# Patient Record
Sex: MALE | Race: WHITE | NOT HISPANIC OR LATINO | URBAN - METROPOLITAN AREA
[De-identification: names, ages, dates, MRNs, and addresses within clinical notes are randomized per-mention and may not be internally consistent; named-entity substitution may affect disease eponyms.]

---

## 2017-01-25 ENCOUNTER — APPOINTMENT (RX ONLY)
Dept: URBAN - METROPOLITAN AREA CLINIC 43 | Facility: CLINIC | Age: 72
Setting detail: DERMATOLOGY
End: 2017-01-25

## 2017-01-25 DIAGNOSIS — L30.9 DERMATITIS, UNSPECIFIED: ICD-10-CM

## 2017-01-25 PROCEDURE — 99212 OFFICE O/P EST SF 10 MIN: CPT

## 2017-01-25 PROCEDURE — ? TREATMENT REGIMEN

## 2017-01-25 ASSESSMENT — LOCATION DETAILED DESCRIPTION DERM
LOCATION DETAILED: RIGHT LATERAL DISTAL PRETIBIAL REGION
LOCATION DETAILED: RIGHT MEDIAL DISTAL PRETIBIAL REGION

## 2017-01-25 ASSESSMENT — LOCATION SIMPLE DESCRIPTION DERM: LOCATION SIMPLE: RIGHT PRETIBIAL REGION

## 2017-01-25 ASSESSMENT — LOCATION ZONE DERM: LOCATION ZONE: LEG

## 2017-01-25 NOTE — PROCEDURE: TREATMENT REGIMEN
Initiate Treatment: Clobetasol bid for 1 week, then qd for 1 week.
Otc Regimen: Leg elevation and compression stockings.  Keep moist with Vaseline.
Plan: Call the office if F/C/S expanding erythema or new onset pain.
Detail Level: Simple

## 2017-02-08 ENCOUNTER — APPOINTMENT (RX ONLY)
Dept: URBAN - METROPOLITAN AREA CLINIC 43 | Facility: CLINIC | Age: 72
Setting detail: DERMATOLOGY
End: 2017-02-08

## 2017-02-08 DIAGNOSIS — L85.3 XEROSIS CUTIS: ICD-10-CM | Status: IMPROVED

## 2017-02-08 PROCEDURE — ? TREATMENT REGIMEN

## 2017-02-08 PROCEDURE — 99212 OFFICE O/P EST SF 10 MIN: CPT

## 2017-02-08 ASSESSMENT — LOCATION DETAILED DESCRIPTION DERM
LOCATION DETAILED: RIGHT ANKLE
LOCATION DETAILED: RIGHT DISTAL PRETIBIAL REGION

## 2017-02-08 ASSESSMENT — LOCATION SIMPLE DESCRIPTION DERM
LOCATION SIMPLE: RIGHT PRETIBIAL REGION
LOCATION SIMPLE: RIGHT ANKLE

## 2017-02-08 ASSESSMENT — LOCATION ZONE DERM: LOCATION ZONE: LEG

## 2017-02-08 NOTE — PROCEDURE: TREATMENT REGIMEN
Detail Level: Simple
Continue Regimen: Leg elevations and compression stockings.
Otc Regimen: OK to transition to OTC moisturizing creams.
Modify Regimen: Clobetasol PRN flares.

## 2017-12-08 ENCOUNTER — APPOINTMENT (RX ONLY)
Dept: URBAN - METROPOLITAN AREA CLINIC 43 | Facility: CLINIC | Age: 72
Setting detail: DERMATOLOGY
End: 2017-12-08

## 2017-12-08 DIAGNOSIS — L57.0 ACTINIC KERATOSIS: ICD-10-CM

## 2017-12-08 DIAGNOSIS — I87.2 VENOUS INSUFFICIENCY (CHRONIC) (PERIPHERAL): ICD-10-CM

## 2017-12-08 PROBLEM — I83.12 VARICOSE VEINS OF LEFT LOWER EXTREMITY WITH INFLAMMATION: Status: ACTIVE | Noted: 2017-12-08

## 2017-12-08 PROBLEM — I83.11 VARICOSE VEINS OF RIGHT LOWER EXTREMITY WITH INFLAMMATION: Status: ACTIVE | Noted: 2017-12-08

## 2017-12-08 PROCEDURE — ? TREATMENT REGIMEN

## 2017-12-08 PROCEDURE — ? COUNSELING

## 2017-12-08 PROCEDURE — 99213 OFFICE O/P EST LOW 20 MIN: CPT

## 2017-12-08 ASSESSMENT — LOCATION DETAILED DESCRIPTION DERM
LOCATION DETAILED: LEFT CENTRAL MALAR CHEEK
LOCATION DETAILED: RIGHT CENTRAL MALAR CHEEK
LOCATION DETAILED: LEFT DISTAL PRETIBIAL REGION
LOCATION DETAILED: RIGHT DISTAL PRETIBIAL REGION
LOCATION DETAILED: LEFT INFERIOR FOREHEAD
LOCATION DETAILED: RIGHT SUPERIOR CRUS OF ANTIHELIX
LOCATION DETAILED: RIGHT INFERIOR FOREHEAD
LOCATION DETAILED: LEFT CYMBA CONCHA

## 2017-12-08 ASSESSMENT — LOCATION ZONE DERM
LOCATION ZONE: EAR
LOCATION ZONE: FACE
LOCATION ZONE: LEG

## 2017-12-08 ASSESSMENT — LOCATION SIMPLE DESCRIPTION DERM
LOCATION SIMPLE: LEFT EAR
LOCATION SIMPLE: LEFT CHEEK
LOCATION SIMPLE: LEFT PRETIBIAL REGION
LOCATION SIMPLE: RIGHT PRETIBIAL REGION
LOCATION SIMPLE: RIGHT CHEEK
LOCATION SIMPLE: RIGHT FOREHEAD
LOCATION SIMPLE: LEFT FOREHEAD
LOCATION SIMPLE: RIGHT EAR

## 2017-12-08 NOTE — PROCEDURE: TREATMENT REGIMEN
Detail Level: Zone
Plan: Worsening AKs on face\\nDiscussed field therapy with 5FU or PDT and he opted for PDT\\nWill be first field therapy for him\\n\\n1 HR INCUBATION TO FACE AND EARS
Plan: Looking very good today \\nHad episode of lower leg cellulitis complicated by sepsis in 1/2017; now recovered\\nRecommended restarting compression stockings\\nContinue bland moisturizer use twice daily to lower legs

## 2018-02-16 ENCOUNTER — APPOINTMENT (RX ONLY)
Dept: URBAN - METROPOLITAN AREA CLINIC 43 | Facility: CLINIC | Age: 73
Setting detail: DERMATOLOGY
End: 2018-02-16

## 2018-02-16 DIAGNOSIS — L57.0 ACTINIC KERATOSIS: ICD-10-CM

## 2018-02-16 PROCEDURE — ? PDT: BLUE

## 2018-02-16 PROCEDURE — 96574 DBRDMT PRMLG LES W/PDT: CPT

## 2018-02-16 ASSESSMENT — LOCATION DETAILED DESCRIPTION DERM: LOCATION DETAILED: RIGHT CENTRAL MALAR CHEEK

## 2018-02-16 ASSESSMENT — LOCATION ZONE DERM: LOCATION ZONE: FACE

## 2018-02-16 ASSESSMENT — LOCATION SIMPLE DESCRIPTION DERM: LOCATION SIMPLE: RIGHT CHEEK

## 2018-02-16 NOTE — PROCEDURE: PDT: BLUE
Debridement Text (Will Only Render In Visit Note If You Select Debridement Option Under Who Performed The Pdt Field): Prior to application of the photodynamic medication the hyperkeratotic lesions were curetted to make them more amenable to therapy.
Treatment Number: 0
Which Photosensitizer Was Used: Levulan
Detail Level: Zone
Occlusion: No
Illumination Time: 01.0 min
Pre-Procedure Text: The treatment areas were cleaned and prepped in the usual fashion.
Who Performed The Pdt?: Performed by MD DIAN, ZARINA or SEEMA with Pre-Procedure Debridement of Hyperkeratotic Lesions ([id number])
Light Source: Chato-U
Anesthesia Type: 1% lidocaine with epinephrine
Consent: Written consent obtained.  The risks were reviewed with the patient including but not limited to: pigmentary changes, pain, blistering, scabbing, redness, and the remote possibility of scarring.
Number Of Kerasticks/Tubes Billed For: 1
Incubation Time: 01:00
Post-Care Instructions: I reviewed with the patient in detail post-care instructions. Patient is to avoid sunlight for the next 2 days, and wear sun protection. Patients may expect sunburn like redness, discomfort and scabbing.

## 2018-02-16 NOTE — HPI: OTHER
Condition:: PDT
Please Describe Your Condition:: Here today for planned TEMO-U PDT to the face and ears for actinic keratoses.  Has not done field therapy in past.  Retired dentist.

## 2018-04-04 ENCOUNTER — APPOINTMENT (RX ONLY)
Dept: URBAN - METROPOLITAN AREA CLINIC 43 | Facility: CLINIC | Age: 73
Setting detail: DERMATOLOGY
End: 2018-04-04

## 2018-04-04 DIAGNOSIS — L57.0 ACTINIC KERATOSIS: ICD-10-CM

## 2018-04-04 PROCEDURE — 99212 OFFICE O/P EST SF 10 MIN: CPT

## 2018-04-04 PROCEDURE — ? TREATMENT REGIMEN

## 2018-04-04 ASSESSMENT — LOCATION SIMPLE DESCRIPTION DERM
LOCATION SIMPLE: RIGHT FOREHEAD
LOCATION SIMPLE: RIGHT CHEEK
LOCATION SIMPLE: LEFT FOREHEAD
LOCATION SIMPLE: LEFT CHEEK

## 2018-04-04 ASSESSMENT — LOCATION DETAILED DESCRIPTION DERM
LOCATION DETAILED: LEFT INFERIOR CENTRAL MALAR CHEEK
LOCATION DETAILED: LEFT LATERAL FOREHEAD
LOCATION DETAILED: RIGHT SUPERIOR LATERAL MALAR CHEEK
LOCATION DETAILED: RIGHT LATERAL FOREHEAD

## 2018-04-04 ASSESSMENT — LOCATION ZONE DERM: LOCATION ZONE: FACE

## 2018-04-04 NOTE — PROCEDURE: TREATMENT REGIMEN
Plan: Actinic keratoses - Improved although not to desired degree after just 1 min PDT after 2 hr incubation \\n- recommend “painless PDT protocol” with 15 min incubation and 1 hr light treatment\\n- will schedule \\n- recheck again 4-6 wks post PDT
Detail Level: Zone

## 2018-05-25 ENCOUNTER — APPOINTMENT (RX ONLY)
Dept: URBAN - METROPOLITAN AREA CLINIC 43 | Facility: CLINIC | Age: 73
Setting detail: DERMATOLOGY
End: 2018-05-25

## 2018-05-25 DIAGNOSIS — L57.0 ACTINIC KERATOSIS: ICD-10-CM

## 2018-05-25 PROCEDURE — 96573 PDT DSTR PRMLG LES PHYS/QHP: CPT

## 2018-05-25 PROCEDURE — ? PDT: BLUE

## 2018-05-25 ASSESSMENT — LOCATION SIMPLE DESCRIPTION DERM: LOCATION SIMPLE: LEFT CHEEK

## 2018-05-25 ASSESSMENT — LOCATION DETAILED DESCRIPTION DERM: LOCATION DETAILED: LEFT CENTRAL MALAR CHEEK

## 2018-05-25 ASSESSMENT — LOCATION ZONE DERM: LOCATION ZONE: FACE

## 2018-05-25 NOTE — HPI: SKIN LESION (ACTINIC KERATOSES)
How Severe Are They?: mild
Is This A New Presentation, Or A Follow-Up?: Follow Up Actinic Keratoses
Additional History: Patient has significant improvement from the beginning of April when he was here for his 1st PDT Treatment. Patient didn’t complete the full duration of the Treatment so has opted to do painless PDT with the approval of GV. Patient has a few thick legions around his temples that are bothersome and persistent. Went over all instructions and questions with patient. Patient is fully aware that he needs to stay indoors for 48hrs. Patient advised if he must go outside that he wear a hat and bandana to cover his face or wait until dark to go out. Patient asked to make f/u appointment in 1 month.

## 2018-05-25 NOTE — PROCEDURE: PDT: BLUE
Total Number Of Aks Treated (Optional To Report): 0
Occlusion: No
Debridement Text (Will Only Render In Visit Note If You Select Debridement Option Under Who Performed The Pdt Field): Prior to application of the photodynamic medication the hyperkeratotic lesions were debrided until no longer hyperkeratotic lesions to make them more amenable to therapy.
Pre-Procedure Text: The treatment areas were cleaned and prepped in the usual fashion.
Consent: Written consent obtained.  The risks were reviewed with the patient including but not limited to: pigmentary changes, pain, blistering, scabbing, redness, and the remote possibility of scarring.
Illumination Time: 1 hour
Who Performed The Pdt?: Performed by MD DIAN, ZARINA or NP ([id number])
Detail Level: Simple
Light Source: Chato-U
Medical Necessity: Precancerous Lesions
Number Of Kerasticks/Tubes Billed For: 1
Anesthesia Type: normal saline
Which Photosensitizer Was Used: Levulan
Post-Care Instructions: I reviewed with the patient in detail post-care instructions. Patient is to avoid sunlight for the next 2 days, and wear sun protection. Patients may expect sunburn like redness, discomfort and scabbing. Plastibase prn throughput peeling process.
Show Anesthesia In Plan?: Yes
Incubation Time: 15:00 minutes

## 2018-08-27 ENCOUNTER — APPOINTMENT (RX ONLY)
Dept: URBAN - METROPOLITAN AREA CLINIC 43 | Facility: CLINIC | Age: 73
Setting detail: DERMATOLOGY
End: 2018-08-27

## 2018-08-27 DIAGNOSIS — L82.1 OTHER SEBORRHEIC KERATOSIS: ICD-10-CM

## 2018-08-27 DIAGNOSIS — L57.0 ACTINIC KERATOSIS: ICD-10-CM | Status: IMPROVED

## 2018-08-27 PROCEDURE — ? TREATMENT REGIMEN

## 2018-08-27 PROCEDURE — 99213 OFFICE O/P EST LOW 20 MIN: CPT

## 2018-08-27 ASSESSMENT — LOCATION DETAILED DESCRIPTION DERM
LOCATION DETAILED: LEFT INFERIOR LATERAL FOREHEAD
LOCATION DETAILED: LEFT MID TEMPLE
LOCATION DETAILED: NASAL DORSUM
LOCATION DETAILED: RIGHT LATERAL MALAR CHEEK
LOCATION DETAILED: LEFT CENTRAL MALAR CHEEK
LOCATION DETAILED: RIGHT MID TEMPLE

## 2018-08-27 ASSESSMENT — LOCATION SIMPLE DESCRIPTION DERM
LOCATION SIMPLE: RIGHT TEMPLE
LOCATION SIMPLE: NOSE
LOCATION SIMPLE: LEFT CHEEK
LOCATION SIMPLE: LEFT TEMPLE
LOCATION SIMPLE: LEFT FOREHEAD
LOCATION SIMPLE: RIGHT CHEEK

## 2018-08-27 ASSESSMENT — LOCATION ZONE DERM
LOCATION ZONE: FACE
LOCATION ZONE: NOSE

## 2018-08-27 NOTE — PROCEDURE: TREATMENT REGIMEN
Detail Level: Zone
Plan: Cleared after PDT in 5/2018\\nRecommend ISDIN “eryphotona actinica”sunscreen with photolyase\\nRecheck again in 6 mo

## 2018-08-27 NOTE — PROCEDURE: REASSURANCE
Detail Level: Generalized
Hide Include Location In Plan Question?: No
Include Location In Plan?: Yes

## 2019-02-25 ENCOUNTER — APPOINTMENT (RX ONLY)
Dept: URBAN - METROPOLITAN AREA CLINIC 43 | Facility: CLINIC | Age: 74
Setting detail: DERMATOLOGY
End: 2019-02-25

## 2019-02-25 DIAGNOSIS — L57.0 ACTINIC KERATOSIS: ICD-10-CM

## 2019-02-25 PROCEDURE — ? TREATMENT REGIMEN

## 2019-02-25 PROCEDURE — 17000 DESTRUCT PREMALG LESION: CPT

## 2019-02-25 PROCEDURE — ? LIQUID NITROGEN

## 2019-02-25 ASSESSMENT — LOCATION ZONE DERM: LOCATION ZONE: FACE

## 2019-02-25 ASSESSMENT — LOCATION SIMPLE DESCRIPTION DERM: LOCATION SIMPLE: RIGHT CHEEK

## 2019-02-25 ASSESSMENT — LOCATION DETAILED DESCRIPTION DERM: LOCATION DETAILED: RIGHT INFERIOR CENTRAL MALAR CHEEK

## 2019-02-25 NOTE — PROCEDURE: LIQUID NITROGEN
Detail Level: Detailed
Number Of Freeze-Thaw Cycles: 2 freeze-thaw cycles
Duration Of Freeze Thaw-Cycle (Seconds): 0
Post-Care Instructions: I reviewed with the patient in detail post-care instructions. Patient is to wear sunprotection, and avoid picking at any of the treated lesions. Pt may apply Vaseline to crusted or scabbing areas.
Consent: The patient's consent was obtained including but not limited to risks of crusting, scabbing, blistering, scarring, darker or lighter pigmentary change, recurrence, incomplete removal and infection.
Render Post-Care Instructions In Note?: no

## 2019-02-25 NOTE — PROCEDURE: TREATMENT REGIMEN
Plan: Overall facial AKs well controlled after recent PDT \\nSingle AK on right cheek treated today with LN2\\nRecheck again in 6 mo\\nNotify clinic if treated lesion persists/recurs
Detail Level: Zone

## 2019-09-24 ENCOUNTER — APPOINTMENT (RX ONLY)
Dept: URBAN - METROPOLITAN AREA CLINIC 43 | Facility: CLINIC | Age: 74
Setting detail: DERMATOLOGY
End: 2019-09-24

## 2019-09-24 DIAGNOSIS — L57.0 ACTINIC KERATOSIS: ICD-10-CM | Status: STABLE

## 2019-09-24 PROCEDURE — 17000 DESTRUCT PREMALG LESION: CPT

## 2019-09-24 PROCEDURE — ? LIQUID NITROGEN

## 2019-09-24 PROCEDURE — 17003 DESTRUCT PREMALG LES 2-14: CPT

## 2019-09-24 ASSESSMENT — LOCATION ZONE DERM
LOCATION ZONE: SCALP
LOCATION ZONE: EAR
LOCATION ZONE: FACE

## 2019-09-24 ASSESSMENT — LOCATION SIMPLE DESCRIPTION DERM
LOCATION SIMPLE: RIGHT EAR
LOCATION SIMPLE: LEFT FOREHEAD
LOCATION SIMPLE: RIGHT FOREHEAD
LOCATION SIMPLE: LEFT EAR
LOCATION SIMPLE: LEFT SCALP

## 2019-09-24 ASSESSMENT — LOCATION DETAILED DESCRIPTION DERM
LOCATION DETAILED: RIGHT FOREHEAD
LOCATION DETAILED: LEFT LATERAL FOREHEAD
LOCATION DETAILED: LEFT FOREHEAD
LOCATION DETAILED: LEFT SUPERIOR HELIX
LOCATION DETAILED: RIGHT SUPERIOR HELIX
LOCATION DETAILED: LEFT CENTRAL FRONTAL SCALP

## 2019-09-24 NOTE — PROCEDURE: LIQUID NITROGEN
Post-Care Instructions: I reviewed with the patient in detail post-care instructions. Patient is to wear sunprotection, and avoid picking at any of the treated lesions. Pt may apply Vaseline to crusted or scabbing areas.
Render Post-Care Instructions In Note?: no
Detail Level: Detailed
Number Of Freeze-Thaw Cycles: 2 freeze-thaw cycles
Duration Of Freeze Thaw-Cycle (Seconds): 0
Consent: The patient's consent was obtained including but not limited to risks of crusting, scabbing, blistering, scarring, darker or lighter pigmentary change, recurrence, incomplete removal and infection.

## 2019-11-13 PROBLEM — I50.23 ACUTE ON CHRONIC SYSTOLIC CONGESTIVE HEART FAILURE (H): Status: ACTIVE | Noted: 2019-08-02

## 2019-11-13 PROBLEM — Z95.5 PRESENCE OF STENT IN CORONARY ARTERY: Status: ACTIVE | Noted: 2018-04-02

## 2019-11-13 PROBLEM — I77.9 CAROTID ARTERIAL DISEASE (H): Status: ACTIVE | Noted: 2018-04-02

## 2019-11-13 PROBLEM — I73.9 PVD (PERIPHERAL VASCULAR DISEASE) (H): Status: ACTIVE | Noted: 2019-11-13

## 2019-11-13 PROBLEM — I48.20 CHRONIC A-FIB (H): Status: ACTIVE | Noted: 2018-10-15

## 2019-11-13 PROBLEM — K92.2 ACUTE LOWER GI BLEEDING: Status: ACTIVE | Noted: 2019-09-17

## 2019-11-13 PROBLEM — Z95.818 S/P MITRAL VALVE CLIP IMPLANTATION: Status: ACTIVE | Noted: 2019-03-11

## 2019-11-13 PROBLEM — I50.9 CHF (CONGESTIVE HEART FAILURE) (H): Status: ACTIVE | Noted: 2018-12-09

## 2019-11-13 PROBLEM — Z98.890 S/P MITRAL VALVE CLIP IMPLANTATION: Status: ACTIVE | Noted: 2019-03-11

## 2019-11-13 PROBLEM — I25.5 ISCHEMIC CARDIOMYOPATHY: Status: ACTIVE | Noted: 2018-12-09

## 2019-11-19 RX ORDER — MAGNESIUM CARB/ALUMINUM HYDROX 105-160MG
1 TABLET,CHEWABLE ORAL 2 TIMES DAILY
COMMUNITY

## 2019-11-19 RX ORDER — ISOSORBIDE MONONITRATE 30 MG/1
30 TABLET, EXTENDED RELEASE ORAL DAILY
COMMUNITY
Start: 2019-11-15 | End: 2020-11-19

## 2019-11-19 RX ORDER — IPRATROPIUM BROMIDE AND ALBUTEROL SULFATE 2.5; .5 MG/3ML; MG/3ML
1 SOLUTION RESPIRATORY (INHALATION) 4 TIMES DAILY PRN
COMMUNITY

## 2019-11-19 RX ORDER — BUDESONIDE 0.5 MG/2ML
1 INHALANT ORAL 2 TIMES DAILY
COMMUNITY

## 2019-11-19 RX ORDER — LEVOTHYROXINE SODIUM 75 UG/1
75 TABLET ORAL DAILY
COMMUNITY

## 2019-11-19 RX ORDER — POTASSIUM CHLORIDE 1500 MG/1
20 TABLET, EXTENDED RELEASE ORAL DAILY
COMMUNITY
Start: 2019-10-18

## 2019-11-19 RX ORDER — ROSUVASTATIN CALCIUM 40 MG/1
40 TABLET, COATED ORAL AT BEDTIME
COMMUNITY

## 2019-11-19 RX ORDER — FENOFIBRIC ACID 135 MG/1
135 CAPSULE, DELAYED RELEASE ORAL AT BEDTIME
COMMUNITY

## 2019-11-19 RX ORDER — SPIRONOLACTONE 25 MG/1
25 TABLET ORAL EVERY MORNING
COMMUNITY
Start: 2019-10-01 | End: 2020-10-05

## 2019-11-19 RX ORDER — LANOLIN ALCOHOL/MO/W.PET/CERES
400 CREAM (GRAM) TOPICAL DAILY
COMMUNITY

## 2019-11-19 RX ORDER — PHENOL 1.4 %
10 AEROSOL, SPRAY (ML) MUCOUS MEMBRANE AT BEDTIME
COMMUNITY

## 2019-11-19 RX ORDER — METOPROLOL SUCCINATE 50 MG/1
50 TABLET, EXTENDED RELEASE ORAL 2 TIMES DAILY
COMMUNITY
Start: 2019-10-01 | End: 2020-10-05

## 2019-11-19 RX ORDER — GLUCOSA SU 2KCL/CHONDROITIN SU 500-400 MG
1 CAPSULE ORAL DAILY
COMMUNITY

## 2019-11-19 RX ORDER — PREDNISONE 10 MG/1
5 TABLET ORAL DAILY
COMMUNITY

## 2019-11-19 RX ORDER — ARFORMOTEROL TARTRATE 15 UG/2ML
15 SOLUTION RESPIRATORY (INHALATION) 2 TIMES DAILY
COMMUNITY
Start: 2018-10-22

## 2019-11-19 RX ORDER — PANTOPRAZOLE SODIUM 40 MG/1
40 TABLET, DELAYED RELEASE ORAL DAILY
COMMUNITY

## 2019-11-19 RX ORDER — MILRINONE LACTATE 0.2 MG/ML
19.3 INJECTION, SOLUTION INTRAVENOUS CONTINUOUS
COMMUNITY
Start: 2019-10-21

## 2019-11-19 RX ORDER — ACETAMINOPHEN 500 MG
500 TABLET ORAL 3 TIMES DAILY PRN
COMMUNITY

## 2019-11-19 RX ORDER — TORSEMIDE 20 MG/1
80 TABLET ORAL 2 TIMES DAILY
COMMUNITY
Start: 2019-10-11

## 2019-11-19 RX ORDER — NITROGLYCERIN 0.4 MG/1
TABLET SUBLINGUAL
COMMUNITY
Start: 2019-08-21

## 2019-11-20 ENCOUNTER — OFFICE VISIT (OUTPATIENT)
Dept: CARDIOLOGY | Facility: CLINIC | Age: 74
End: 2019-11-20
Attending: INTERNAL MEDICINE
Payer: MEDICARE

## 2019-11-20 VITALS
OXYGEN SATURATION: 92 % | RESPIRATION RATE: 18 BRPM | BODY MASS INDEX: 22.02 KG/M2 | WEIGHT: 132.2 LBS | DIASTOLIC BLOOD PRESSURE: 58 MMHG | TEMPERATURE: 98.9 F | HEART RATE: 89 BPM | HEIGHT: 65 IN | SYSTOLIC BLOOD PRESSURE: 90 MMHG

## 2019-11-20 DIAGNOSIS — I50.84 END STAGE HEART FAILURE (H): Primary | ICD-10-CM

## 2019-11-20 PROCEDURE — 99205 OFFICE O/P NEW HI 60 MIN: CPT | Mod: ZP | Performed by: INTERNAL MEDICINE

## 2019-11-20 PROCEDURE — G0463 HOSPITAL OUTPT CLINIC VISIT: HCPCS | Mod: ZF

## 2019-11-20 ASSESSMENT — MIFFLIN-ST. JEOR: SCORE: 1261.54

## 2019-11-20 NOTE — NURSING NOTE
Chief Complaint   Patient presents with     New Patient     New Advanced Heart Failure     Vitals taken and meds reviewed.    Cheo Cabrera CMA   CORE/Heart Failure   Advanced Heart Failure Referral Coordinator  [facility]

## 2019-11-20 NOTE — PATIENT INSTRUCTIONS
You were seen today by [facility] Advanced Heart Failure Cardiologist, Dr. Nelia Hurt, in partnership with [facility] in [place], [place]       Plan of care changes:          If there are any questions about this visit please call us at [phone].    Follow up:               Follow the American Heart Association Diet and Lifestyle recommendations:      Limit saturated fat, trans fat, sodium, red meat, sweets and sugar-sweetened beverages.     If you choose to eat red meat, compare labels and select the leanest cuts available.    Aim for at least 150 minutes of moderate physical activity or 75 minutes of vigorous physical activity - or an equal combination of both - each week.      If you have any other questions or concerns regarding your heart care please contact your [facility] Team at [phone].

## 2019-11-20 NOTE — PROGRESS NOTES
[facility] Advanced Heart Failure Clinic First Time consultation Notes       Johnnie Dodson MD    [address] [place] [address]    [phone]    [phone] (Fax)        Pepe Toussaint MD    [address] [place] [address]    [phone]    [phone] (Fax)      Irasema Perez, DEIDRE-CHIOMA    [address] [place] [address]    [phone]    [phone] (Fax)      HPI:     Dear Colleagues,     I had the pleasure of seeing Rigo Nick in the  [facility] Advanced Heart Failure Clinic on November 19, 2019.     As you know the patient is a very pleasant 73 year old male with a history of long standing systolic heart failure who presents today for consideration of advanced therapies.     Rigo Nick is a 73yr old male with a past medical history of chronic systolic heart failure, ischemic cardiomyopathy, CAD s/p CABG and PCI, severe MR s/p MitraClip x2 on 11/1/18, chronic afib, HTN, HLD, PVD, COPD, hypothyroidism and DM2.      Was admitted in 11/2018 with HF exacerbation. Diuresed 12 lbs with IV lasix to a weight of 111 lbs on discharge. HF medications were adjusted. Was also treated for COPD exacerbation. He underwent CardioMems implant on 12/13/18 and then underwent single chamber ICD implant on 12/17/18.      Was started on Entreso on 2/20/19 at which time he was also referred to Dr. Toussaint for discussion of possible advanced therapies due to difficulty with titrations and PAd rising. His HF medications have been adjusted and optimized, but PAd has continued to be elevated and thresholds were increased. Treated with metolazone at times. His diuretics have been adjusted frequently for elevated PA pressures. In the past, he has not been interested in referral to the [facility] for consideration of advanced therapies.     CPX was recommended to further assess his functional status, but he has not completed it. He was  admitted in 08/2019 with HF exacerbation and diuresed to a discharge weight of 11 lbs. Angiogram that admission revealed patent LIMA to LAD, relatively patent LCX and Ramus and relatively patent RCA. He did not follow up with HF clinic after that visit. Diuretics continued to be adjusted based on PAd. Was then most recently admitted 9/17-10/1/19. Initially was admitted with GIB/Hematochezia. Anticoagulants were held. He underwent EGD and colonoscopy, which showed no source of bleeding. Diuretics were initially held as well. He then developed HF exacerbation. Repeat echocardiogram showed further decrement in EF to 15-20%. He was ultimately found to be in cardiogenic shock and started on milrinone infusion. He was diuresed to a weight of 114 lbs. The milrinone was initially weaned, but renal function and symptoms worsened again, and milrinone was restarted and he was deemed to be inotrope dependent. PICC line was placed and he was discharged on continuous milrinone infusion. He presents today for HF follow-up.     Today Rigo reports he is feeling ok. He reports feeling quite wore out and tired. He reports mild dyspnea on exertion, much improved from admission, stable from discharge. He denies shortness of breath at rest, orthopnea or paroxysmal nocturnal dyspnea. He denies chest pain/pressure or palpitations. He denies dizziness, lightheadedness or syncope. He denies lower extremity edema or abdominal bloating/distention. His weight is running 116-117 lbs at home, was 117 lbs this morning. Weight in clinic today is 123 lbs.       PAST MEDICAL HISTORY:    Acute lower GI bleeding 09/17/2019   GI bleed 09/17/2019   CHF (congestive heart failure) 08/02/2019   Acute on chronic systolic congestive heart failure 08/02/2019   S/P mitral valve clip implantation 03/11/2019   Ischemic cardiomyopathy 12/09/2018   Heart failure managed at heart failure clinic 12/09/2018   Last Assessment & Plan:       He was last seen in the heart  failure clinic on 04/15/2019.    He started Entresto on 02/20/2019.    He is considering undergoing cardiopulmonary stress testing for possible referral/evaluation for LVAD in the future.     Chronic a-fib 10/15/2018   Non-rheumatic mitral regurgitation 05/29/2018   Carotid arterial disease 04/02/2018   Presence of stent in coronary artery 04/02/2018   Chronic obstructive pulmonary disease 10/28/2015   Last Assessment & Plan:       Overall his symptoms are stable and at baseline. SPO2 95% on room air today.     He reports some ongoing sputum production, denies shortness of breath at rest.     Feels his dyspnea on exertion has improved slightly over the last few weeks.     He's been on prednisone 7.5-10 mg for a long time, he is unsure who started him on it.    I discussed with him and his wife gradually wean the prednisone to see if he is still needing it or benefiting from it's use.     Plan is to wean the prednisone to 5mg (1/2 tab for 1-2 weeks), then 2.5 (1/4 tab for 1-2 weeks), then stop. If you become more symptomatic or don't tolerate the reduction, please call us and we can consider resuming the prior dose.     We will also plan on completing an overnight oximetry study on room air, as his E providers requesting we retest him.     After reviewing and updating patient's smoking history, it appears he would be eligible for LDCT lung screening. 5 minutes were spent counseling patient and his wife on benefits and risks of LDCT lung screening. There are no prior CTs of the chest available for review in his EMR, he does not think he has had one previously. He did have a CT of the abdomen on 04/22/2018 which did show emphysematous changes in the lungs and bibasilar pulmonary fibrosis, but there is no mention of any lung nodules.    After discussion, patient wishes to hold off on screening at this time and would like to discuss it again in about 6 months.     Diabetes type 2, controlled 07/17/2014   TIMMYD of  shoulder 08/23/2013   Anemia 07/25/2013   Hypothyroidism 02/17/2011   DJD (degenerative joint disease) of knee 07/22/2010   Clinical systolic heart failure 04/15/2010   Neck pain 04/15/2010   Mixed hyperlipidemia 10/01/2009   Allergic rhinitis 01/03/2008   GERD (gastroesophageal reflux disease) 01/03/2008   Last Assessment & Plan:       On Protonix, has occasional breakthrough symptoms and will take as needed Gaviscon.     Reports breakthrough symptoms are related to his dietary choices.      Hypertension 05/11/2007   Last Assessment & Plan:     Formatting of this note might be different from the original.  BP Readings from Last 3 Encounters:   04/22/19 115/55   04/15/19 108/62   04/03/19 110/62     BP looks good today, pulse 95.     ASHD (arteriosclerotic heart disease) 05/11/2007   Overview:     Prior MI's and CABG's     Cerebrovascular disease 05/11/2007   Overview:     L MCA partial distribution CVA     Chronic low back pain 05/11/2007   Overview:     Abstracted from [facility]       IBS (irritable bowel syndrome) 05/11/2007   Chronic anticoagulation 05/11/2007   Last Assessment & Plan:       On pradaxa      PVD (peripheral vascular disease)          FAMILY HISTORY:    SOCIAL HISTORY:      CURRENT MEDICATIONS:    Current Outpatient Medications   Medication Sig Dispense Refill     acetaminophen (TYLENOL) 500 MG tablet Take 500 mg by mouth 3 times daily as needed for mild pain       Alum Hydroxide-Mag Trisilicate 80-20 MG CHEW Take 1-2 tablets by mouth every 4 hours as needed       apixaban ANTICOAGULANT (ELIQUIS) 2.5 MG tablet Take 2.5 mg by mouth 2 times daily For atrial fib       arformoterol (BROVANA) 15 MCG/2ML NEBU neb solution Take 15 mcg by nebulization 2 times daily       aspirin (ASA) 81 MG EC tablet Take 81 mg by mouth daily       budesonide (PULMICORT) 0.5 MG/2ML neb solution Take 1 ampule by nebulization 2 times daily       Calcium Carb-Cholecalciferol 600-800 MG-UNIT TABS Take 1 tablet by  mouth 2 times daily       Choline Fenofibrate (FENOFIBRIC ACID) 135 MG CPDR Take 135 mg by mouth At Bedtime       Ferrous Sulfate (IRON) 325 (65 Fe) MG tablet Take 325 mg by mouth At Bedtime       folic acid (FOLVITE) 400 MCG tablet Take 400 mcg by mouth daily       glucosamine-chondroitinoitin 750-600 MG TABS Take 1 tablet by mouth 2 times daily       ipratropium - albuterol 0.5 mg/2.5 mg/3 mL (DUONEB) 0.5-2.5 (3) MG/3ML neb solution Take 1 vial by nebulization 4 times daily as needed for shortness of breath / dyspnea       Ipratropium-Albuterol (COMBIVENT RESPIMAT)  MCG/ACT inhaler Inhale 1 puff into the lungs every 4 hours as needed for wheezing or cough       isosorbide mononitrate (IMDUR) 30 MG 24 hr tablet Take 30 mg by mouth daily       levothyroxine (SYNTHROID/LEVOTHROID) 75 MCG tablet Take 75 mcg by mouth daily       Melatonin 10 MG TABS tablet Take 10 mg by mouth At Bedtime       metoprolol succinate ER (TOPROL-XL) 50 MG 24 hr tablet Take 50 mg by mouth 2 times daily       milrinone (PRIMACOR) infusion 200 mcg/mL PREMIX Inject 19.3 mcg/min into the vein continuous       Multiple Vitamins-Minerals (SENTRY SENIOR) TABS Take 1 tablet by mouth daily       nitroGLYcerin (NITROSTAT) 0.4 MG sublingual tablet DISSOLVE 1 TABLET UNDER THE TONGUE EVERY 5 MINUTES AS NEEDED FOR CHEST PAIN. MAY REPEAT EVERY 5 MINUTES FOR A TOTAL OF 3 DOSES       pantoprazole (PROTONIX) 40 MG EC tablet Take 40 mg by mouth daily       potassium chloride ER (K-DUR/KLOR-CON M) 20 MEQ CR tablet Take 20 mEq by mouth daily       predniSONE (DELTASONE) 10 MG tablet Take 5 mg by mouth daily       PROPYLENE GLYCOL-GLYCERIN OP Place 1 drop into both eyes 4 times daily For dry eyes       rosuvastatin (CRESTOR) 40 MG tablet Take 40 mg by mouth At Bedtime       sacubitril-valsartan (ENTRESTO)  MG per tablet Take 1 tablet by mouth 2 times daily       spironolactone (ALDACTONE) 25 MG tablet Take 25 mg by mouth every morning       torsemide  (DEMADEX) 20 MG tablet Take 80 mg by mouth 2 times daily         ROS:   Constitutional: No fever, chills, or sweats. Weight . + fatigue   ENT: No visual disturbance, ear ache, epistaxis, sore throat.   Allergies/Immunologic: Negative.   Respiratory: No cough, hemoptysis.   Cardiovascular: As per HPI.   GI: No nausea, vomiting, hematemesis, melena, or hematochezia.   : No urinary frequency, dysuria, or hematuria.   Integument: Negative.   Psychiatric: Negative.   Neuro: Negative.   Endocrinology: Negative.   Musculoskeletal: Negative.    EXAM:  There were no vitals taken for this visit.   General: appears comfortable, alert and articulate  Head: normocephalic, atraumatic  Eyes: anicteric sclera, EOMI  Neck: no adenopathy  Orophyarynx: moist mucosa, no lesions, dentition intact  Heart: PMI diffuse,trace systolic murmur at LLSB, regular, S1/S2, rub, estimated JVP 9 cm   Lungs: clear, no rales or wheezing  Abdomen: soft, non-tender, bowel sounds present, no hepatosplenomegaly  Extremities: no clubbing, cyanosis or edema  Neurological: normal speech and affect, no gross motor deficits    Labs:      The left ventricle is severely dilated.  Global left ventricular systolic function is severely reduced.  Ejection Fraction = 15-20%.  Global hypokinesis is noted.  The right ventricle is severely dilated.  The left atrium is severely enlarged.  The right atrium is severely dilated.  Medial and lateral mitraclip present.  There is mild (+1) mitral regurgitation.  The mitral valve mean gradient is 4.5mmHg.  Moderate tricuspid regurgitation by color Doppler.  Pulmonary artery systolic pressure is measured at 51 mmHg.      Procedures The study performed was a(n) limited 2-D echo with color and spectral Doppler.  The study was performed by [facility].  The study quality was diagnostic.  The study was performed in the patient's room.     Aortic Valve There is no aortic valvular vegetation.     Right Ventricle There  is normal right ventricular wall thickness.     Technical Comments Technically difficult study.  Technically limited due to patient's body habitus.  Technically limited images due to lung interference.     Left Ventricle The left ventricle is severely dilated.     Right Ventricle The right ventricle is severely dilated.     Left Ventricle There is normal left ventricular wall thickness.  Global left ventricular systolic function is severely reduced.  Ejection Fraction = 15-20%.  There is no thrombus.     Atria The left atrium is severely enlarged.  The right atrium is severely dilated.     Aortic Valve Moderate aortic valve sclerosis without significant stenosis.  No aortic valve stenosis.  No aortic regurgitation is present.     Mitral Valve There is mild (+1) mitral regurgitation.  Medial and lateral mitraclip present.     Tricuspid Valve The tricuspid valve is normal in structure.  Moderate tricuspid regurgitation by color Doppler.  Pulmonary artery systolic pressure is measured at 51 mmHg.     Pulmonary Valve The pulmonary valve is normal in structure.  There is no pulmonic valvular vegetation.  Trace pulmonic valvular insufficiency.     Aorta and PA The aortic root is normal in size.  The pulmonary artery is normal size.     Pericardial Pleural Effusion Pericardium without effusion.  There is no pleural effusion.     Mitral Valve The mitral valve mean gradient is 4.5mmHg.     Left Ventricle Global hypokinesis is noted.     MMODE 2D MEASUREMENTS CALCULATIONS IVSd: 0.8cm  IVSs: 1.0cm  LVIDd: 6.3cm  LVIDs: 5.8cm  LVPWd: 0.9cm  LVPWs: 1.0cm  LA dimension: 5.6cm  EDV(MOD-sp4): 159ml  ESV(MOD-sp4): 130ml  EF(MOD-sp4): 18.2%  LVLd ap2: 8.4cm  EDV(MOD-sp2): 147ml  ESV(MOD-sp2): 122ml  EF(MOD-sp2): 17.0%     DOPPLER MEASUREMENTS CALCULATIONS MV V2 max: 158.0cm/sec  MV max PG: 10.0mmHg  MV mean PG: 3.9mmHg  MV V2 VTI: 30.9cm  TR max ced: 299.9cm/sec  RVSP(TR): 51.2mmHg  RAP systole: 15mmHg        Atrial  fibrillation  Right bundle branch block  compared to prior EKG 8/6/19  Right bundle branch block remains.  T-wave inversion in the anterior leads remains unchanged.  No significant change from prior.          Last albumin 4.2         DOMINANCE:  Right  LMCA:  Patent - Left Main  LAD:  Up to 80 % Lesion Proximal In stent restenosis, diffuse - LAD  DIAGONAL 1:  99 % Lesion Ostial In stent restenosis - 1st Diagonal  OM 1:  Up to 60 % Lesion Proximal Diffuse - 1st OM  RCA:  Up to 50 % Lesion Proximal Diffuse - RCA  90 % Lesion Mid  Ulcerated, In stent restenosis - RCA  ELFEGO GRAFT:  LIMA graft to the LAD is patent    PROCEDURE NOTES:    LOCAL ANESTHETIC:  Local anesthetic to right groin region with Lidocaine 1%    PROCEDURAL APPROACH:  Access: 5 Fr Sheath was inserted into the right femoral artery  Access: 5 Fr Sheath was exchanged in the right femoral artery  Access: 6 Fr Sheath was exchanged in the right femoral artery  Access: 6 Fr Sheath was exchanged in the right femoral artery    POST SHEATH STATUS:  Sutured in with heparinized normal saline under pressure right femoral artery.  To be pulled 3 hours.    POST SITE STATUS:  No bleeding / swelling - Right groin - tegaderm applied    CONTRAST:  Visipaque 120 ml's      Last abdominal CT     IMPRESSION:  1. No active bleeding site was identified.  2. Atherosclerotic vascular disease involving the abdominal aorta and the iliac vessels.  3. Diverticulosis.  4. Severe emphysema involving the visualized lung.  5. Cardiomegaly.      Assessment and Plan:             CC  No care team member to display

## 2019-11-20 NOTE — PROGRESS NOTES
[facility] First Time consultation Notes     Johnnie Dodson MD    [address] [place] [address]    [phone])      Pepe Toussaint MD    [address]     [place] [address]    [phone]    [phone] (Fax)      Irasema Perez, DEIDRE-CHIOMA    [address] [place] [address]    [phone]    [phone] ([phone])        Dear Colleagues,     I had the pleasure of seeing Rigo Nick in the  [facility] on November 19, 2019.     As you know the patient is a very pleasant 73 year old male with a history of long standing ischemic cardiomyopathy who presents today for consideration of advanced therapies.     His cardiac history begins in the 1980s when he developed coronary artery disease and had CABG his last bypass was in 1989-he is subsequently had stents placed, he has had a mitral clip 2 clips -in 11/1/2018.  He has chronic atrial fibrillation and was recently admitted in cardiogenic shock and is now on milrinone.    The patient reports a progressive decline over the last several years.  He cannot even walk 20 feet despite milrinone.  He is dropped around 30 pounds of muscle mass in the last year.  He is to weigh 140 pounds and his dry weight is around 110.  He is 120 on her home scale today.     He has extreme fatigue, has started doing less and less housework, he was working part-time up until September and then stopped.  He endorses increasing abdominal girth, PND and orthopnea.  No nausea since starting milrinone although he did have nausea before that.     He and his wife are interested in hearing whether or not there are any further options to treat his heart failure as they understand he has end-stage disease.     Of note he recently had a GI bleed although this was on antiplatelet therapy as well as anticoagulation.  No source was found.     His  milrinone infusion site has been clean dry and intact without erythema he has not had fever or chills..     PAST MEDICAL HISTORY:    Acute lower GI bleeding 09/17/2019   GI bleed 09/17/2019   CHF (congestive heart failure) 08/02/2019   Acute on chronic systolic congestive heart failure 08/02/2019   S/P mitral valve clip implantation 03/11/2019   Ischemic cardiomyopathy 12/09/2018   Heart failure managed at heart failure clinic 12/09/2018   Last Assessment & Plan:       He was last seen in the heart failure clinic on 04/15/2019.    He started Entresto on 02/20/2019.    He is considering undergoing cardiopulmonary stress testing for possible referral/evaluation for LVAD in the future.     Chronic a-fib 10/15/2018   Non-rheumatic mitral regurgitation 05/29/2018   Carotid arterial disease 04/02/2018   Presence of stent in coronary artery 04/02/2018   Chronic obstructive pulmonary disease 10/28/2015      Diabetes type 2, controlled 07/17/2014   DJD of shoulder 08/23/2013   Anemia 07/25/2013   Hypothyroidism 02/17/2011   DJD (degenerative joint disease) of knee 07/22/2010   Clinical systolic heart failure 04/15/2010   Neck pain 04/15/2010   Mixed hyperlipidemia 10/01/2009   Allergic rhinitis 01/03/2008   GERD (gastroesophageal reflux disease) 01/03/2008   Last Assessment & Plan:       On Protonix, has occasional breakthrough symptoms and will take as needed Gaviscon.     Reports breakthrough symptoms are related to his dietary choices.      Hypertension 05/11/2007   Last Assessment & Plan:     Formatting of this note might be different from the original.  BP Readings from Last 3 Encounters:   04/22/19 115/55   04/15/19 108/62   04/03/19 110/62     BP looks good today, pulse 95.     ASHD (arteriosclerotic heart disease) 05/11/2007   Overview:     Prior MI's and CABG's     Cerebrovascular disease 05/11/2007   Overview:     L MCA partial distribution CVA     Chronic low back pain 05/11/2007   Overview:     Abstracted from [facility]  [address]       IBS (irritable bowel syndrome) 2007   Chronic anticoagulation 2007   Last Assessment & Plan:       On pradaxa      PVD (peripheral vascular disease)      CAB     FAMILY HISTORY:  There is family history of coronary artery disease, no premature sudden cardiac death or heart failure      SOCIAL HISTORY:  The patient is with his wife today.  He has 2 children.  He was working part-time up until September.  He quit smoking 1 year ago today.  He was a heavier drinker but quit in the       CURRENT MEDICATIONS:    Current Outpatient Medications   Medication Sig Dispense Refill     acetaminophen (TYLENOL) 500 MG tablet Take 500 mg by mouth 3 times daily as needed for mild pain       Alum Hydroxide-Mag Trisilicate 80-20 MG CHEW Take 1-2 tablets by mouth every 4 hours as needed       apixaban ANTICOAGULANT (ELIQUIS) 2.5 MG tablet Take 2.5 mg by mouth 2 times daily For atrial fib       arformoterol (BROVANA) 15 MCG/2ML NEBU neb solution Take 15 mcg by nebulization 2 times daily       aspirin (ASA) 81 MG EC tablet Take 81 mg by mouth daily       budesonide (PULMICORT) 0.5 MG/2ML neb solution Take 1 ampule by nebulization 2 times daily       Calcium Carb-Cholecalciferol 600-800 MG-UNIT TABS Take 1 tablet by mouth 2 times daily       Choline Fenofibrate (FENOFIBRIC ACID) 135 MG CPDR Take 135 mg by mouth At Bedtime       Ferrous Sulfate (IRON) 325 (65 Fe) MG tablet Take 325 mg by mouth At Bedtime       folic acid (FOLVITE) 400 MCG tablet Take 400 mcg by mouth daily       glucosamine-chondroitinoitin 750-600 MG TABS Take 1 tablet by mouth 2 times daily       ipratropium - albuterol 0.5 mg/2.5 mg/3 mL (DUONEB) 0.5-2.5 (3) MG/3ML neb solution Take 1 vial by nebulization 4 times daily as needed for shortness of breath / dyspnea       Ipratropium-Albuterol (COMBIVENT RESPIMAT)  MCG/ACT inhaler Inhale 1 puff into the lungs every 4 hours as needed for wheezing or cough       isosorbide  mononitrate (IMDUR) 30 MG 24 hr tablet Take 30 mg by mouth daily       levothyroxine (SYNTHROID/LEVOTHROID) 75 MCG tablet Take 75 mcg by mouth daily       Melatonin 10 MG TABS tablet Take 10 mg by mouth At Bedtime       metoprolol succinate ER (TOPROL-XL) 50 MG 24 hr tablet Take 50 mg by mouth 2 times daily       milrinone (PRIMACOR) infusion 200 mcg/mL PREMIX Inject 19.3 mcg/min into the vein continuous       Multiple Vitamins-Minerals (SENTRY SENIOR) TABS Take 1 tablet by mouth daily       nitroGLYcerin (NITROSTAT) 0.4 MG sublingual tablet DISSOLVE 1 TABLET UNDER THE TONGUE EVERY 5 MINUTES AS NEEDED FOR CHEST PAIN. MAY REPEAT EVERY 5 MINUTES FOR A TOTAL OF 3 DOSES       pantoprazole (PROTONIX) 40 MG EC tablet Take 40 mg by mouth daily       potassium chloride ER (K-DUR/KLOR-CON M) 20 MEQ CR tablet Take 20 mEq by mouth daily       predniSONE (DELTASONE) 10 MG tablet Take 5 mg by mouth daily       PROPYLENE GLYCOL-GLYCERIN OP Place 1 drop into both eyes 4 times daily For dry eyes       rosuvastatin (CRESTOR) 40 MG tablet Take 40 mg by mouth At Bedtime       sacubitril-valsartan (ENTRESTO)  MG per tablet Take 1 tablet by mouth 2 times daily       spironolactone (ALDACTONE) 25 MG tablet Take 25 mg by mouth every morning       torsemide (DEMADEX) 20 MG tablet Take 80 mg by mouth 2 times daily       ROS:   Constitutional: No fever, chills, or sweats. Weight loss as outlined in the HPI. + fatigue   ENT: No visual disturbance, ear ache, epistaxis, sore throat.   Allergies/Immunologic: Negative.   Respiratory: No cough, hemoptysis.   Cardiovascular: As per HPI.   GI: No nausea, vomiting, hematemesis, melena, or hematochezia.   : No urinary frequency, dysuria, or hematuria.   Integument: Negative.   Psychiatric: depressed about his current health   Neuro: Negative.   Endocrinology: Negative.   Musculoskeletal: Negative.    EXAM:  BP 90/58 (BP Location: Left arm, Patient Position: Sitting, Cuff Size: Adult Regular)   " Pulse 89   Temp 98.9  F (37.2  C) (Temporal)   Resp 18   Ht 1.651 m (5' 5\")   Wt 60 kg (132 lb 3.2 oz)   SpO2 92%   BMI 22.00 kg/m    General: appears comfortable, alert and articulate, thin appears older than his stated age  Head: normocephalic, atraumatic  Eyes: anicteric sclera, EOMI  Neck: no adenopathy  Orophyarynx: moist mucosa, no lesions, dentition intact  Heart: PMI diffuse,trace systolic murmur at LLSB, regular, S1/S2, rub, estimated JVP 15 cm with prominent V waves  Lungs: Trace bibasilar crackles, mild expiratory wheeze  Abdomen: soft, non-tender, bowel sounds present, no hepatosplenomegaly-abdomen is distended  Extremities: no clubbing, cyanosis, 1+ lower extremity edema  Neurological: normal speech and affect, no gross motor deficits    Labs:    The left ventricle is severely dilated.  Global left ventricular systolic function is severely reduced.  Ejection Fraction = 15-20%.  Global hypokinesis is noted.  The right ventricle is severely dilated.  The left atrium is severely enlarged.  The right atrium is severely dilated.  Medial and lateral mitraclip present.  There is mild (+1) mitral regurgitation.  The mitral valve mean gradient is 4.5mmHg.  Moderate tricuspid regurgitation by color Doppler.  Pulmonary artery systolic pressure is measured at 51 mmHg.      Procedures The study performed was a(n) limited 2-D echo with color and spectral Doppler.  The study was performed by [facility].  The study quality was diagnostic.  The study was performed in the patient's room.     Aortic Valve There is no aortic valvular vegetation.     Right Ventricle There is normal right ventricular wall thickness.     Technical Comments Technically difficult study.  Technically limited due to patient's body habitus.  Technically limited images due to lung interference.     Left Ventricle The left ventricle is severely dilated.     Right Ventricle The right ventricle is severely dilated.     Left " Ventricle There is normal left ventricular wall thickness.  Global left ventricular systolic function is severely reduced.  Ejection Fraction = 15-20%.  There is no thrombus.     Atria The left atrium is severely enlarged.  The right atrium is severely dilated.     Aortic Valve Moderate aortic valve sclerosis without significant stenosis.  No aortic valve stenosis.  No aortic regurgitation is present.     Mitral Valve There is mild (+1) mitral regurgitation.  Medial and lateral mitraclip present.     Tricuspid Valve The tricuspid valve is normal in structure.  Moderate tricuspid regurgitation by color Doppler.  Pulmonary artery systolic pressure is measured at 51 mmHg.     Pulmonary Valve The pulmonary valve is normal in structure.  There is no pulmonic valvular vegetation.  Trace pulmonic valvular insufficiency.     Aorta and PA The aortic root is normal in size.  The pulmonary artery is normal size.     Pericardial Pleural Effusion Pericardium without effusion.  There is no pleural effusion.     Mitral Valve The mitral valve mean gradient is 4.5mmHg.     Left Ventricle Global hypokinesis is noted.     MMODE 2D MEASUREMENTS CALCULATIONS IVSd: 0.8cm  IVSs: 1.0cm  LVIDd: 6.3cm  LVIDs: 5.8cm  LVPWd: 0.9cm  LVPWs: 1.0cm  LA dimension: 5.6cm  EDV(MOD-sp4): 159ml  ESV(MOD-sp4): 130ml  EF(MOD-sp4): 18.2%  LVLd ap2: 8.4cm  EDV(MOD-sp2): 147ml  ESV(MOD-sp2): 122ml  EF(MOD-sp2): 17.0%     DOPPLER MEASUREMENTS CALCULATIONS MV V2 max: 158.0cm/sec  MV max PG: 10.0mmHg  MV mean PG: 3.9mmHg  MV V2 VTI: 30.9cm  TR max ced: 299.9cm/sec  RVSP(TR): 51.2mmHg  RAP systole: 15mmHg        Atrial fibrillation  Right bundle branch block  compared to prior EKG 8/6/19  Right bundle branch block remains.  T-wave inversion in the anterior leads remains unchanged.  No significant change from prior.          Last albumin 4.2     DOMINANCE:  Right  LMCA:  Patent - Left Main  LAD:  Up to 80 % Lesion Proximal In stent restenosis, diffuse -  LAD  DIAGONAL 1:  99 % Lesion Ostial In stent restenosis - 1st Diagonal  OM 1:  Up to 60 % Lesion Proximal Diffuse - 1st OM  RCA:  Up to 50 % Lesion Proximal Diffuse - RCA  90 % Lesion Mid  Ulcerated, In stent restenosis - RCA  ELFEGO GRAFT:  LIMA graft to the LAD is patent    PROCEDURE NOTES:    LOCAL ANESTHETIC:  Local anesthetic to right groin region with Lidocaine 1%    PROCEDURAL APPROACH:  Access: 5 Fr Sheath was inserted into the right femoral artery  Access: 5 Fr Sheath was exchanged in the right femoral artery  Access: 6 Fr Sheath was exchanged in the right femoral artery  Access: 6 Fr Sheath was exchanged in the right femoral artery    POST SHEATH STATUS:  Sutured in with heparinized normal saline under pressure right femoral artery.  To be pulled 3 hours.    POST SITE STATUS:  No bleeding / swelling - Right groin - tegaderm applied    CONTRAST:  Visipaque 120 ml's    Last abdominal CT     IMPRESSION:  1. No active bleeding site was identified.  2. Atherosclerotic vascular disease involving the abdominal aorta and the iliac vessels.  3. Diverticulosis.  4. Severe emphysema involving the visualized lung.  5. Cardiomegaly.      Assessment and Plan:   In summary this is a very pleasant 73-year-old man with end-stage ischemic cardiomyopathy by all measures.  He is presently inotrope dependent, stage D, INTERMACS 3.  I have some concern that he has ongoing volume overload on inotropes.  The time for us to make a decision is short..     Looking at his chart and meeting him in person I have some concerns about his candidacy for advanced therapies.  He is quite thin, although not overtly frail  in clinic, his right ventricular function is moderately to severely reduced on his last echocardiogram here, his CT scan of the abdomen that I reviewed shows quite a bit of emphysema in his bases although I do not have a full view of his lungs. -I am encouraged by the fact that he is not on chronic oxygen, he has not had  repeated admissions for COPD and his lung function is actually better than he thought he was going to be on his recent pulmonary function tests.     I have told him that none of what I am seeing is an absolute deal breaker in terms of candidacy for  advanced therapies but that we would need to do a full evaluation and an opinion from the larger team to be able to spell out his risk .  I estimate his prognosis to be around 20% at 6 months the way things are currently going.  I also want to make sure that if he does go for the left ventricular assist device that will be successful and so I just need more information at this point.     Propose that he come to the [facility] within the next week to b admitted for ongoing decompensated systolic heart failure despite inotropes, and for us to serially assess his right ventricle with retained [place] in and finish his eval in house.     Should he have poor RV hemodynamics in addition to these other comorbidities that I think we would likely turn him down but there is a chance that we would consider him. He was working up until recently and doing yard work.     First heart failure with reduced ejection fraction I am giving him metolazone today, will continue his torsemide 80 twice daily, and continuing his Entresto, beta-blocker and inotrope at the current doses.  We are getting labs today.  Should he feel worse in the meantime before we can organize his admission in [place] I want him to come straight to the [facility] sooner.       Thank you for the consult and look forward to working with you going forward.     Nelia Hurt MD      CC

## 2019-11-20 NOTE — LETTER
11/20/2019      RE: Rigo Nick  [address]  [place] [address]       Dear Colleague,    Thank you for the opportunity to participate in the care of your patient, Rigo Nick, at the [facility] at [facility]. Please see a copy of my visit note below.      [facility] Failure [facility] First Time consultation Notes       Johnnie Dodson MD    [address] [place] [address]    [phone]    [phone] (Fax)        Pepe Toussaint MD    [address] [place] [address]    [phone]    [phone] (Fax)      Irasema Perez, DEIDRE-CHIOMA    [address] [place] [address]    [phone]    [phone] (Fax)      HPI:     Dear Colleagues,     I had the pleasure of seeing Rigo Nick in the  [facility] Failure [facility] on November 19, 2019.     As you know the patient is a very pleasant 73 year old male with a history of long standing systolic heart failure who presents today for consideration of advanced therapies.     Rigo Nick is a 73yr old male with a past medical history of chronic systolic heart failure, ischemic cardiomyopathy, CAD s/p CABG and PCI, severe MR s/p MitraClip x2 on 11/1/18, chronic afib, HTN, HLD, PVD, COPD, hypothyroidism and DM2.      Was admitted in 11/2018 with HF exacerbation. Diuresed 12 lbs with IV lasix to a weight of 111 lbs on discharge. HF medications were adjusted. Was also treated for COPD exacerbation. He underwent CardioMems implant on 12/13/18 and then underwent single chamber ICD implant on 12/17/18.      Was started on Entreso on 2/20/19 at which time he was also referred to Dr. Toussaint for discussion of possible advanced therapies due to difficulty with titrations and PAd rising. His HF medications have been adjusted and optimized, but PAd has continued to be elevated and thresholds were increased. Treated  with metolazone at times. His diuretics have been adjusted frequently for elevated PA pressures. In the past, he has not been interested in referral to the [facility] for consideration of advanced therapies.     CPX was recommended to further assess his functional status, but he has not completed it. He was admitted in 08/2019 with HF exacerbation and diuresed to a discharge weight of 11 lbs. Angiogram that admission revealed patent LIMA to LAD, relatively patent LCX and Ramus and relatively patent RCA. He did not follow up with HF clinic after that visit. Diuretics continued to be adjusted based on PAd. Was then most recently admitted 9/17-10/1/19. Initially was admitted with GIB/Hematochezia. Anticoagulants were held. He underwent EGD and colonoscopy, which showed no source of bleeding. Diuretics were initially held as well. He then developed HF exacerbation. Repeat echocardiogram showed further decrement in EF to 15-20%. He was ultimately found to be in cardiogenic shock and started on milrinone infusion. He was diuresed to a weight of 114 lbs. The milrinone was initially weaned, but renal function and symptoms worsened again, and milrinone was restarted and he was deemed to be inotrope dependent. PICC line was placed and he was discharged on continuous milrinone infusion. He presents today for HF follow-up.     Today Rigo reports he is feeling ok. He reports feeling quite wore out and tired. He reports mild dyspnea on exertion, much improved from admission, stable from discharge. He denies shortness of breath at rest, orthopnea or paroxysmal nocturnal dyspnea. He denies chest pain/pressure or palpitations. He denies dizziness, lightheadedness or syncope. He denies lower extremity edema or abdominal bloating/distention. His weight is running 116-117 lbs at home, was 117 lbs this morning. Weight in clinic today is 123 lbs.       PAST MEDICAL HISTORY:    Acute lower GI bleeding 09/17/2019   GI bleed  09/17/2019   CHF (congestive heart failure) 08/02/2019   Acute on chronic systolic congestive heart failure 08/02/2019   S/P mitral valve clip implantation 03/11/2019   Ischemic cardiomyopathy 12/09/2018   Heart failure managed at heart failure clinic 12/09/2018   Last Assessment & Plan:       He was last seen in the heart failure clinic on 04/15/2019.    He started Entresto on 02/20/2019.    He is considering undergoing cardiopulmonary stress testing for possible referral/evaluation for LVAD in the future.     Chronic a-fib 10/15/2018   Non-rheumatic mitral regurgitation 05/29/2018   Carotid arterial disease 04/02/2018   Presence of stent in coronary artery 04/02/2018   Chronic obstructive pulmonary disease 10/28/2015   Last Assessment & Plan:       Overall his symptoms are stable and at baseline. SPO2 95% on room air today.     He reports some ongoing sputum production, denies shortness of breath at rest.     Feels his dyspnea on exertion has improved slightly over the last few weeks.     He's been on prednisone 7.5-10 mg for a long time, he is unsure who started him on it.    I discussed with him and his wife gradually wean the prednisone to see if he is still needing it or benefiting from it's use.     Plan is to wean the prednisone to 5mg (1/2 tab for 1-2 weeks), then 2.5 (1/4 tab for 1-2 weeks), then stop. If you become more symptomatic or don't tolerate the reduction, please call us and we can consider resuming the prior dose.     We will also plan on completing an overnight oximetry study on room air, as his E providers requesting we retest him.     After reviewing and updating patient's smoking history, it appears he would be eligible for LDCT lung screening. 5 minutes were spent counseling patient and his wife on benefits and risks of LDCT lung screening. There are no prior CTs of the chest available for review in his EMR, he does not think he has had one previously. He did have a CT of the abdomen on  04/22/2018 which did show emphysematous changes in the lungs and bibasilar pulmonary fibrosis, but there is no mention of any lung nodules.    After discussion, patient wishes to hold off on screening at this time and would like to discuss it again in about 6 months.     Diabetes type 2, controlled 07/17/2014   DJD of shoulder 08/23/2013   Anemia 07/25/2013   Hypothyroidism 02/17/2011   DJD (degenerative joint disease) of knee 07/22/2010   Clinical systolic heart failure 04/15/2010   Neck pain 04/15/2010   Mixed hyperlipidemia 10/01/2009   Allergic rhinitis 01/03/2008   GERD (gastroesophageal reflux disease) 01/03/2008   Last Assessment & Plan:       On Protonix, has occasional breakthrough symptoms and will take as needed Gaviscon.     Reports breakthrough symptoms are related to his dietary choices.      Hypertension 05/11/2007   Last Assessment & Plan:     Formatting of this note might be different from the original.  BP Readings from Last 3 Encounters:   04/22/19 115/55   04/15/19 108/62   04/03/19 110/62     BP looks good today, pulse 95.     ASHD (arteriosclerotic heart disease) 05/11/2007   Overview:     Prior MI's and CABG's     Cerebrovascular disease 05/11/2007   Overview:     L MCA partial distribution CVA     Chronic low back pain 05/11/2007   Overview:     [facility]ed from [facility]       IBS (irritable bowel syndrome) 05/11/2007   Chronic anticoagulation 05/11/2007   Last Assessment & Plan:       On pradaxa      PVD (peripheral vascular disease)          FAMILY HISTORY:    SOCIAL HISTORY:      CURRENT MEDICATIONS:    Current Outpatient Medications   Medication Sig Dispense Refill     acetaminophen (TYLENOL) 500 MG tablet Take 500 mg by mouth 3 times daily as needed for mild pain       Alum Hydroxide-Mag Trisilicate 80-20 MG CHEW Take 1-2 tablets by mouth every 4 hours as needed       apixaban ANTICOAGULANT (ELIQUIS) 2.5 MG tablet Take 2.5 mg by mouth 2 times daily For atrial fib        arformoterol (BROVANA) 15 MCG/2ML NEBU neb solution Take 15 mcg by nebulization 2 times daily       aspirin (ASA) 81 MG EC tablet Take 81 mg by mouth daily       budesonide (PULMICORT) 0.5 MG/2ML neb solution Take 1 ampule by nebulization 2 times daily       Calcium Carb-Cholecalciferol 600-800 MG-UNIT TABS Take 1 tablet by mouth 2 times daily       Choline Fenofibrate (FENOFIBRIC ACID) 135 MG CPDR Take 135 mg by mouth At Bedtime       Ferrous Sulfate (IRON) 325 (65 Fe) MG tablet Take 325 mg by mouth At Bedtime       folic acid (FOLVITE) 400 MCG tablet Take 400 mcg by mouth daily       glucosamine-chondroitinoitin 750-600 MG TABS Take 1 tablet by mouth 2 times daily       ipratropium - albuterol 0.5 mg/2.5 mg/3 mL (DUONEB) 0.5-2.5 (3) MG/3ML neb solution Take 1 vial by nebulization 4 times daily as needed for shortness of breath / dyspnea       Ipratropium-Albuterol (COMBIVENT RESPIMAT)  MCG/ACT inhaler Inhale 1 puff into the lungs every 4 hours as needed for wheezing or cough       isosorbide mononitrate (IMDUR) 30 MG 24 hr tablet Take 30 mg by mouth daily       levothyroxine (SYNTHROID/LEVOTHROID) 75 MCG tablet Take 75 mcg by mouth daily       Melatonin 10 MG TABS tablet Take 10 mg by mouth At Bedtime       metoprolol succinate ER (TOPROL-XL) 50 MG 24 hr tablet Take 50 mg by mouth 2 times daily       milrinone (PRIMACOR) infusion 200 mcg/mL PREMIX Inject 19.3 mcg/min into the vein continuous       Multiple Vitamins-Minerals (SENTRY SENIOR) TABS Take 1 tablet by mouth daily       nitroGLYcerin (NITROSTAT) 0.4 MG sublingual tablet DISSOLVE 1 TABLET UNDER THE TONGUE EVERY 5 MINUTES AS NEEDED FOR CHEST PAIN. MAY REPEAT EVERY 5 MINUTES FOR A TOTAL OF 3 DOSES       pantoprazole (PROTONIX) 40 MG EC tablet Take 40 mg by mouth daily       potassium chloride ER (K-DUR/KLOR-CON M) 20 MEQ CR tablet Take 20 mEq by mouth daily       predniSONE (DELTASONE) 10 MG tablet Take 5 mg by mouth daily       PROPYLENE  GLYCOL-GLYCERIN OP Place 1 drop into both eyes 4 times daily For dry eyes       rosuvastatin (CRESTOR) 40 MG tablet Take 40 mg by mouth At Bedtime       sacubitril-valsartan (ENTRESTO)  MG per tablet Take 1 tablet by mouth 2 times daily       spironolactone (ALDACTONE) 25 MG tablet Take 25 mg by mouth every morning       torsemide (DEMADEX) 20 MG tablet Take 80 mg by mouth 2 times daily         ROS:   Constitutional: No fever, chills, or sweats. Weight . + fatigue   ENT: No visual disturbance, ear ache, epistaxis, sore throat.   Allergies/Immunologic: Negative.   Respiratory: No cough, hemoptysis.   Cardiovascular: As per HPI.   GI: No nausea, vomiting, hematemesis, melena, or hematochezia.   : No urinary frequency, dysuria, or hematuria.   Integument: Negative.   Psychiatric: Negative.   Neuro: Negative.   Endocrinology: Negative.   Musculoskeletal: Negative.    EXAM:  There were no vitals taken for this visit.   General: appears comfortable, alert and articulate  Head: normocephalic, atraumatic  Eyes: anicteric sclera, EOMI  Neck: no adenopathy  Orophyarynx: moist mucosa, no lesions, dentition intact  Heart: PMI diffuse,trace systolic murmur at LLSB, regular, S1/S2, rub, estimated JVP 9 cm   Lungs: clear, no rales or wheezing  Abdomen: soft, non-tender, bowel sounds present, no hepatosplenomegaly  Extremities: no clubbing, cyanosis or edema  Neurological: normal speech and affect, no gross motor deficits    Labs:      The left ventricle is severely dilated.  Global left ventricular systolic function is severely reduced.  Ejection Fraction = 15-20%.  Global hypokinesis is noted.  The right ventricle is severely dilated.  The left atrium is severely enlarged.  The right atrium is severely dilated.  Medial and lateral mitraclip present.  There is mild (+1) mitral regurgitation.  The mitral valve mean gradient is 4.5mmHg.  Moderate tricuspid regurgitation by color Doppler.  Pulmonary artery systolic pressure  is measured at 51 mmHg.      Procedures The study performed was a(n) limited 2-D echo with color and spectral Doppler.  The study was performed by [facility].  The study quality was diagnostic.  The study was performed in the patient's room.     Aortic Valve There is no aortic valvular vegetation.     Right Ventricle There is normal right ventricular wall thickness.     Technical Comments Technically difficult study.  Technically limited due to patient's body habitus.  Technically limited images due to lung interference.     Left Ventricle The left ventricle is severely dilated.     Right Ventricle The right ventricle is severely dilated.     Left Ventricle There is normal left ventricular wall thickness.  Global left ventricular systolic function is severely reduced.  Ejection Fraction = 15-20%.  There is no thrombus.     Atria The left atrium is severely enlarged.  The right atrium is severely dilated.     Aortic Valve Moderate aortic valve sclerosis without significant stenosis.  No aortic valve stenosis.  No aortic regurgitation is present.     Mitral Valve There is mild (+1) mitral regurgitation.  Medial and lateral mitraclip present.     Tricuspid Valve The tricuspid valve is normal in structure.  Moderate tricuspid regurgitation by color Doppler.  Pulmonary artery systolic pressure is measured at 51 mmHg.     Pulmonary Valve The pulmonary valve is normal in structure.  There is no pulmonic valvular vegetation.  Trace pulmonic valvular insufficiency.     Aorta and PA The aortic root is normal in size.  The pulmonary artery is normal size.     Pericardial Pleural Effusion Pericardium without effusion.  There is no pleural effusion.     Mitral Valve The mitral valve mean gradient is 4.5mmHg.     Left Ventricle Global hypokinesis is noted.     MMODE 2D MEASUREMENTS CALCULATIONS IVSd: 0.8cm  IVSs: 1.0cm  LVIDd: 6.3cm  LVIDs: 5.8cm  LVPWd: 0.9cm  LVPWs: 1.0cm  LA dimension: 5.6cm  EDV(MOD-sp4):  159ml  ESV(MOD-sp4): 130ml  EF(MOD-sp4): 18.2%  LVLd ap2: 8.4cm  EDV(MOD-sp2): 147ml  ESV(MOD-sp2): 122ml  EF(MOD-sp2): 17.0%     DOPPLER MEASUREMENTS CALCULATIONS MV V2 max: 158.0cm/sec  MV max PG: 10.0mmHg  MV mean PG: 3.9mmHg  MV V2 VTI: 30.9cm  TR max ced: 299.9cm/sec  RVSP(TR): 51.2mmHg  RAP systole: 15mmHg        Atrial fibrillation  Right bundle branch block  compared to prior EKG 8/6/19  Right bundle branch block remains.  T-wave inversion in the anterior leads remains unchanged.  No significant change from prior.          Last albumin 4.2         DOMINANCE:  Right  LMCA:  Patent - Left Main  LAD:  Up to 80 % Lesion Proximal In stent restenosis, diffuse - LAD  DIAGONAL 1:  99 % Lesion Ostial In stent restenosis - 1st Diagonal  OM 1:  Up to 60 % Lesion Proximal Diffuse - 1st OM  RCA:  Up to 50 % Lesion Proximal Diffuse - RCA  90 % Lesion Mid  Ulcerated, In stent restenosis - RCA  ELFEGO GRAFT:  LIMA graft to the LAD is patent    PROCEDURE NOTES:    LOCAL ANESTHETIC:  Local anesthetic to right groin region with Lidocaine 1%    PROCEDURAL APPROACH:  Access: 5 Fr Sheath was inserted into the right femoral artery  Access: 5 Fr Sheath was exchanged in the right femoral artery  Access: 6 Fr Sheath was exchanged in the right femoral artery  Access: 6 Fr Sheath was exchanged in the right femoral artery    POST SHEATH STATUS:  Sutured in with heparinized normal saline under pressure right femoral artery.  To be pulled 3 hours.    POST SITE STATUS:  No bleeding / swelling - Right groin - tegaderm applied    CONTRAST:  Visipaque 120 ml's      Last abdominal CT     IMPRESSION:  1. No active bleeding site was identified.  2. Atherosclerotic vascular disease involving the abdominal aorta and the iliac vessels.  3. Diverticulosis.  4. Severe emphysema involving the visualized lung.  5. Cardiomegaly.      Assessment and Plan:             CC  No care team member to display            [facility] Advanced Heart  Failure Clinic First Time consultation Notes     Johnnie Dodson MD    [address] [place] [address]    [phone]    [phone] (Fax)      Pepe Toussaint MD    [address] [place] [address]    [phone]    [phone] (Fax)      Irasema Perez, DEIDRE-CHIOMA    [address] [place] [address]    [phone]    [phone] (Fax)        Dear Colleagues,     I had the pleasure of seeing Rigo Nick in the  [facility] on November 19, 2019.     As you know the patient is a very pleasant 73 year old male with a history of long standing ischemic cardiomyopathy who presents today for consideration of advanced therapies.     His cardiac history begins in the 1980s when he developed coronary artery disease and had CABG his last bypass was in 1989-he is subsequently had stents placed, he has had a mitral clip 2 clips -in 11/1/2018.  He has chronic atrial fibrillation and was recently admitted in cardiogenic shock and is now on milrinone.    The patient reports a progressive decline over the last several years.  He cannot even walk 20 feet despite milrinone.  He is dropped around 30 pounds of muscle mass in the last year.  He is to weigh 140 pounds and his dry weight is around 110.  He is 120 on her home scale today.     He has extreme fatigue, has started doing less and less housework, he was working part-time up until September and then stopped.  He endorses increasing abdominal girth, PND and orthopnea.  No nausea since starting milrinone although he did have nausea before that.     He and his wife are interested in hearing whether or not there are any further options to treat his heart failure as they understand he has end-stage disease.     Of note he recently had a GI bleed although this was on antiplatelet therapy as well as anticoagulation.  No source was found.     His milrinone infusion site has been clean dry and  intact without erythema he has not had fever or chills..     PAST MEDICAL HISTORY:    Acute lower GI bleeding 09/17/2019   GI bleed 09/17/2019   CHF (congestive heart failure) 08/02/2019   Acute on chronic systolic congestive heart failure 08/02/2019   S/P mitral valve clip implantation 03/11/2019   Ischemic cardiomyopathy 12/09/2018   Heart failure managed at heart failure clinic 12/09/2018   Last Assessment & Plan:       He was last seen in the heart failure clinic on 04/15/2019.    He started Entresto on 02/20/2019.    He is considering undergoing cardiopulmonary stress testing for possible referral/evaluation for LVAD in the future.     Chronic a-fib 10/15/2018   Non-rheumatic mitral regurgitation 05/29/2018   Carotid arterial disease 04/02/2018   Presence of stent in coronary artery 04/02/2018   Chronic obstructive pulmonary disease 10/28/2015      Diabetes type 2, controlled 07/17/2014   DJD of shoulder 08/23/2013   Anemia 07/25/2013   Hypothyroidism 02/17/2011   DJD (degenerative joint disease) of knee 07/22/2010   Clinical systolic heart failure 04/15/2010   Neck pain 04/15/2010   Mixed hyperlipidemia 10/01/2009   Allergic rhinitis 01/03/2008   GERD (gastroesophageal reflux disease) 01/03/2008   Last Assessment & Plan:       On Protonix, has occasional breakthrough symptoms and will take as needed Gaviscon.     Reports breakthrough symptoms are related to his dietary choices.      Hypertension 05/11/2007   Last Assessment & Plan:     Formatting of this note might be different from the original.  BP Readings from Last 3 Encounters:   04/22/19 115/55   04/15/19 108/62   04/03/19 110/62     BP looks good today, pulse 95.     ASHD (arteriosclerotic heart disease) 05/11/2007   Overview:     Prior MI's and CABG's     Cerebrovascular disease 05/11/2007   Overview:     L MCA partial distribution CVA     Chronic low back pain 05/11/2007   Overview:     [facility]ed [facility]       IBS (irritable bowel  syndrome) 2007   Chronic anticoagulation 2007   Last Assessment & Plan:       On pradaxa      PVD (peripheral vascular disease)      CAB     FAMILY HISTORY:  There is family history of coronary artery disease, no premature sudden cardiac death or heart failure      SOCIAL HISTORY:  The patient is with his wife today.  He has 2 children.  He was working part-time up until September.  He quit smoking 1 year ago today.  He was a heavier drinker but quit in the 1980s      CURRENT MEDICATIONS:    Current Outpatient Medications   Medication Sig Dispense Refill     acetaminophen (TYLENOL) 500 MG tablet Take 500 mg by mouth 3 times daily as needed for mild pain       Alum Hydroxide-Mag Trisilicate 80-20 MG CHEW Take 1-2 tablets by mouth every 4 hours as needed       apixaban ANTICOAGULANT (ELIQUIS) 2.5 MG tablet Take 2.5 mg by mouth 2 times daily For atrial fib       arformoterol (BROVANA) 15 MCG/2ML NEBU neb solution Take 15 mcg by nebulization 2 times daily       aspirin (ASA) 81 MG EC tablet Take 81 mg by mouth daily       budesonide (PULMICORT) 0.5 MG/2ML neb solution Take 1 ampule by nebulization 2 times daily       Calcium Carb-Cholecalciferol 600-800 MG-UNIT TABS Take 1 tablet by mouth 2 times daily       Choline Fenofibrate (FENOFIBRIC ACID) 135 MG CPDR Take 135 mg by mouth At Bedtime       Ferrous Sulfate (IRON) 325 (65 Fe) MG tablet Take 325 mg by mouth At Bedtime       folic acid (FOLVITE) 400 MCG tablet Take 400 mcg by mouth daily       glucosamine-chondroitinoitin 750-600 MG TABS Take 1 tablet by mouth 2 times daily       ipratropium - albuterol 0.5 mg/2.5 mg/3 mL (DUONEB) 0.5-2.5 (3) MG/3ML neb solution Take 1 vial by nebulization 4 times daily as needed for shortness of breath / dyspnea       Ipratropium-Albuterol (COMBIVENT RESPIMAT)  MCG/ACT inhaler Inhale 1 puff into the lungs every 4 hours as needed for wheezing or cough       isosorbide mononitrate (IMDUR) 30 MG 24 hr tablet Take  30 mg by mouth daily       levothyroxine (SYNTHROID/LEVOTHROID) 75 MCG tablet Take 75 mcg by mouth daily       Melatonin 10 MG TABS tablet Take 10 mg by mouth At Bedtime       metoprolol succinate ER (TOPROL-XL) 50 MG 24 hr tablet Take 50 mg by mouth 2 times daily       milrinone (PRIMACOR) infusion 200 mcg/mL PREMIX Inject 19.3 mcg/min into the vein continuous       Multiple Vitamins-Minerals (SENTRY SENIOR) TABS Take 1 tablet by mouth daily       nitroGLYcerin (NITROSTAT) 0.4 MG sublingual tablet DISSOLVE 1 TABLET UNDER THE TONGUE EVERY 5 MINUTES AS NEEDED FOR CHEST PAIN. MAY REPEAT EVERY 5 MINUTES FOR A TOTAL OF 3 DOSES       pantoprazole (PROTONIX) 40 MG EC tablet Take 40 mg by mouth daily       potassium chloride ER (K-DUR/KLOR-CON M) 20 MEQ CR tablet Take 20 mEq by mouth daily       predniSONE (DELTASONE) 10 MG tablet Take 5 mg by mouth daily       PROPYLENE GLYCOL-GLYCERIN OP Place 1 drop into both eyes 4 times daily For dry eyes       rosuvastatin (CRESTOR) 40 MG tablet Take 40 mg by mouth At Bedtime       sacubitril-valsartan (ENTRESTO)  MG per tablet Take 1 tablet by mouth 2 times daily       spironolactone (ALDACTONE) 25 MG tablet Take 25 mg by mouth every morning       torsemide (DEMADEX) 20 MG tablet Take 80 mg by mouth 2 times daily       ROS:   Constitutional: No fever, chills, or sweats. Weight loss as outlined in the HPI. + fatigue   ENT: No visual disturbance, ear ache, epistaxis, sore throat.   Allergies/Immunologic: Negative.   Respiratory: No cough, hemoptysis.   Cardiovascular: As per HPI.   GI: No nausea, vomiting, hematemesis, melena, or hematochezia.   : No urinary frequency, dysuria, or hematuria.   Integument: Negative.   Psychiatric: depressed about his current health   Neuro: Negative.   Endocrinology: Negative.   Musculoskeletal: Negative.    EXAM:  BP 90/58 (BP Location: Left arm, Patient Position: Sitting, Cuff Size: Adult Regular)   Pulse 89   Temp 98.9  F (37.2  C)  "(Temporal)   Resp 18   Ht 1.651 m (5' 5\")   Wt 60 kg (132 lb 3.2 oz)   SpO2 92%   BMI 22.00 kg/m     General: appears comfortable, alert and articulate, thin appears older than his stated age  Head: normocephalic, atraumatic  Eyes: anicteric sclera, EOMI  Neck: no adenopathy  Orophyarynx: moist mucosa, no lesions, dentition intact  Heart: PMI diffuse,trace systolic murmur at LLSB, regular, S1/S2, rub, estimated JVP 15 cm with prominent V waves  Lungs: Trace bibasilar crackles, mild expiratory wheeze  Abdomen: soft, non-tender, bowel sounds present, no hepatosplenomegaly-abdomen is distended  Extremities: no clubbing, cyanosis, 1+ lower extremity edema  Neurological: normal speech and affect, no gross motor deficits    Labs:    The left ventricle is severely dilated.  Global left ventricular systolic function is severely reduced.  Ejection Fraction = 15-20%.  Global hypokinesis is noted.  The right ventricle is severely dilated.  The left atrium is severely enlarged.  The right atrium is severely dilated.  Medial and lateral mitraclip present.  There is mild (+1) mitral regurgitation.  The mitral valve mean gradient is 4.5mmHg.  Moderate tricuspid regurgitation by color Doppler.  Pulmonary artery systolic pressure is measured at 51 mmHg.      Procedures The study performed was a(n) limited 2-D echo with color and spectral Doppler.  The study was performed by [facility].  The study quality was diagnostic.  The study was performed in the patient's room.     Aortic Valve There is no aortic valvular vegetation.     Right Ventricle There is normal right ventricular wall thickness.     Technical Comments Technically difficult study.  Technically limited due to patient's body habitus.  Technically limited images due to lung interference.     Left Ventricle The left ventricle is severely dilated.     Right Ventricle The right ventricle is severely dilated.     Left Ventricle There is normal left " ventricular wall thickness.  Global left ventricular systolic function is severely reduced.  Ejection Fraction = 15-20%.  There is no thrombus.     Atria The left atrium is severely enlarged.  The right atrium is severely dilated.     Aortic Valve Moderate aortic valve sclerosis without significant stenosis.  No aortic valve stenosis.  No aortic regurgitation is present.     Mitral Valve There is mild (+1) mitral regurgitation.  Medial and lateral mitraclip present.     Tricuspid Valve The tricuspid valve is normal in structure.  Moderate tricuspid regurgitation by color Doppler.  Pulmonary artery systolic pressure is measured at 51 mmHg.     Pulmonary Valve The pulmonary valve is normal in structure.  There is no pulmonic valvular vegetation.  Trace pulmonic valvular insufficiency.     Aorta and PA The aortic root is normal in size.  The pulmonary artery is normal size.     Pericardial Pleural Effusion Pericardium without effusion.  There is no pleural effusion.     Mitral Valve The mitral valve mean gradient is 4.5mmHg.     Left Ventricle Global hypokinesis is noted.     MMODE 2D MEASUREMENTS CALCULATIONS IVSd: 0.8cm  IVSs: 1.0cm  LVIDd: 6.3cm  LVIDs: 5.8cm  LVPWd: 0.9cm  LVPWs: 1.0cm  LA dimension: 5.6cm  EDV(MOD-sp4): 159ml  ESV(MOD-sp4): 130ml  EF(MOD-sp4): 18.2%  LVLd ap2: 8.4cm  EDV(MOD-sp2): 147ml  ESV(MOD-sp2): 122ml  EF(MOD-sp2): 17.0%     DOPPLER MEASUREMENTS CALCULATIONS MV V2 max: 158.0cm/sec  MV max PG: 10.0mmHg  MV mean PG: 3.9mmHg  MV V2 VTI: 30.9cm  TR max ced: 299.9cm/sec  RVSP(TR): 51.2mmHg  RAP systole: 15mmHg        Atrial fibrillation  Right bundle branch block  compared to prior EKG 8/6/19  Right bundle branch block remains.  T-wave inversion in the anterior leads remains unchanged.  No significant change from prior.          Last albumin 4.2     DOMINANCE:  Right  LMCA:  Patent - Left Main  LAD:  Up to 80 % Lesion Proximal In stent restenosis, diffuse - LAD  DIAGONAL 1:  99 % Lesion Ostial In  stent restenosis - 1st Diagonal  OM 1:  Up to 60 % Lesion Proximal Diffuse - 1st OM  RCA:  Up to 50 % Lesion Proximal Diffuse - RCA  90 % Lesion Mid  Ulcerated, In stent restenosis - RCA  ELFEGO GRAFT:  LIMA graft to the LAD is patent    PROCEDURE NOTES:    LOCAL ANESTHETIC:  Local anesthetic to right groin region with Lidocaine 1%    PROCEDURAL APPROACH:  Access: 5 Fr Sheath was inserted into the right femoral artery  Access: 5 Fr Sheath was exchanged in the right femoral artery  Access: 6 Fr Sheath was exchanged in the right femoral artery  Access: 6 Fr Sheath was exchanged in the right femoral artery    POST SHEATH STATUS:  Sutured in with heparinized normal saline under pressure right femoral artery.  To be pulled 3 hours.    POST SITE STATUS:  No bleeding / swelling - Right groin - tegaderm applied    CONTRAST:  Visipaque 120 ml's    Last abdominal CT     IMPRESSION:  1. No active bleeding site was identified.  2. Atherosclerotic vascular disease involving the abdominal aorta and the iliac vessels.  3. Diverticulosis.  4. Severe emphysema involving the visualized lung.  5. Cardiomegaly.      Assessment and Plan:   In summary this is a very pleasant 73-year-old man with end-stage ischemic cardiomyopathy by all measures.  He is presently inotrope dependent, stage D, INTERMACS 3.  I have some concern that he has ongoing volume overload on inotropes.  The time for us to make a decision is short..     Looking at his chart and meeting him in person I have some concerns about his candidacy for advanced therapies.  He is quite thin, although not overtly frail  in clinic, his right ventricular function is moderately to severely reduced on his last echocardiogram here, his CT scan of the abdomen that I reviewed shows quite a bit of emphysema in his bases although I do not have a full view of his lungs. -I am encouraged by the fact that he is not on chronic oxygen, he has not had repeated admissions for COPD and his lung  function is actually better than he thought he was going to be on his recent pulmonary function tests.     I have told him that none of what I am seeing is an absolute deal breaker in terms of candidacy for  advanced therapies but that we would need to do a full evaluation and an opinion from the larger team to be able to spell out his risk .  I estimate his prognosis to be around 20% at 6 months the way things are currently going.  I also want to make sure that if he does go for the left ventricular assist device that will be successful and so I just need more information at this point.     Propose that he come to the [facility] within the next week to b admitted for ongoing decompensated systolic heart failure despite inotropes, and for us to serially assess his right ventricle with retained [facility] in and finish his eval in house.     Should he have poor RV hemodynamics in addition to these other comorbidities that I think we would likely turn him down but there is a chance that we would consider him. He was working up until recently and doing yard work.     First heart failure with reduced ejection fraction I am giving him metolazone today, will continue his torsemide 80 twice daily, and continuing his Entresto, beta-blocker and inotrope at the current doses.  We are getting labs today.  Should he feel worse in the meantime before we can organize his admission in [place] I want him to come straight to the [facility] sooner.       Thank you for the consult and look forward to working with you going forward.     Nelia Hurt MD

## 2020-04-27 ENCOUNTER — APPOINTMENT (RX ONLY)
Dept: URBAN - METROPOLITAN AREA CLINIC 43 | Facility: CLINIC | Age: 75
Setting detail: DERMATOLOGY
End: 2020-04-27

## 2020-04-27 DIAGNOSIS — L57.0 ACTINIC KERATOSIS: ICD-10-CM | Status: STABLE

## 2020-04-27 PROCEDURE — ? TREATMENT REGIMEN

## 2020-04-27 PROCEDURE — 99212 OFFICE O/P EST SF 10 MIN: CPT

## 2020-04-27 NOTE — PROCEDURE: TREATMENT REGIMEN
Plan: History of AKs; treated in past with PDT and LN2\\nNone evident today\\nContinue UV protection\\nSee back 1 yr
Detail Level: Zone

## 2020-10-19 ENCOUNTER — RX ONLY (OUTPATIENT)
Age: 75
Setting detail: RX ONLY
End: 2020-10-19

## 2020-10-19 RX ORDER — CLOBETASOL PROPIONATE 0.5 MG/G
OINTMENT TOPICAL
Qty: 1 | Refills: 0 | Status: ERX | COMMUNITY
Start: 2020-10-19

## 2021-03-30 ENCOUNTER — APPOINTMENT (RX ONLY)
Dept: URBAN - METROPOLITAN AREA CLINIC 43 | Facility: CLINIC | Age: 76
Setting detail: DERMATOLOGY
End: 2021-03-30

## 2021-03-30 DIAGNOSIS — L85.3 XEROSIS CUTIS: ICD-10-CM

## 2021-03-30 DIAGNOSIS — L57.0 ACTINIC KERATOSIS: ICD-10-CM

## 2021-03-30 PROCEDURE — 99213 OFFICE O/P EST LOW 20 MIN: CPT

## 2021-03-30 PROCEDURE — ? PRESCRIPTION

## 2021-03-30 PROCEDURE — ? TREATMENT REGIMEN

## 2021-03-30 RX ORDER — CLOBETASOL PROPIONATE 0.5 MG/G
OINTMENT TOPICAL
Qty: 1 | Refills: 1 | Status: ERX | COMMUNITY
Start: 2021-03-30

## 2021-03-30 RX ADMIN — CLOBETASOL PROPIONATE: 0.5 OINTMENT TOPICAL at 00:00

## 2021-03-30 ASSESSMENT — LOCATION DETAILED DESCRIPTION DERM
LOCATION DETAILED: RIGHT ANKLE
LOCATION DETAILED: RIGHT POPLITEAL SKIN
LOCATION DETAILED: LEFT SUPERIOR CENTRAL MALAR CHEEK
LOCATION DETAILED: LEFT ANKLE
LOCATION DETAILED: RIGHT DISTAL PRETIBIAL REGION

## 2021-03-30 ASSESSMENT — LOCATION ZONE DERM
LOCATION ZONE: FACE
LOCATION ZONE: LEG

## 2021-03-30 ASSESSMENT — LOCATION SIMPLE DESCRIPTION DERM
LOCATION SIMPLE: RIGHT ANKLE
LOCATION SIMPLE: RIGHT POPLITEAL SKIN
LOCATION SIMPLE: LEFT CHEEK
LOCATION SIMPLE: RIGHT PRETIBIAL REGION
LOCATION SIMPLE: LEFT ANKLE

## 2021-03-30 NOTE — HPI: EVALUATION OF SKIN LESION(S)
What Type Of Note Output Would You Prefer (Optional)?: Standard Output
How Severe Are Your Spot(S)?: mild
Have Your Spot(S) Been Treated In The Past?: has not been treated
Hpi Title: Evaluation of a Skin Lesion
Additional History: Patient is concerned with a growth on his R posterior knee that wont go away.
Additional History: Patient would like his face checked.

## 2021-03-30 NOTE — PROCEDURE: TREATMENT REGIMEN
Plan: History of AKs; treated in past with PDT and LN2\\nHas several on exam today, all small and asymptomatic \\nContinue UV protection
Detail Level: Zone
Detail Level: Simple
Continue Regimen: Leg elevations and compression stockings.
Otc Regimen: OK to transition to OTC moisturizing creams.
Modify Regimen: Clobetasol PRN flares.

## 2022-03-29 ENCOUNTER — APPOINTMENT (RX ONLY)
Dept: URBAN - METROPOLITAN AREA CLINIC 43 | Facility: CLINIC | Age: 77
Setting detail: DERMATOLOGY
End: 2022-03-29

## 2022-03-29 DIAGNOSIS — L57.0 ACTINIC KERATOSIS: ICD-10-CM | Status: INADEQUATELY CONTROLLED

## 2022-03-29 PROCEDURE — 99213 OFFICE O/P EST LOW 20 MIN: CPT

## 2022-03-29 PROCEDURE — ? TREATMENT REGIMEN

## 2022-03-29 ASSESSMENT — LOCATION DETAILED DESCRIPTION DERM
LOCATION DETAILED: LEFT FOREHEAD
LOCATION DETAILED: RIGHT INFERIOR FOREHEAD
LOCATION DETAILED: LEFT SUPERIOR CENTRAL MALAR CHEEK
LOCATION DETAILED: LEFT CENTRAL MALAR CHEEK

## 2022-03-29 ASSESSMENT — LOCATION ZONE DERM: LOCATION ZONE: FACE

## 2022-03-29 ASSESSMENT — LOCATION SIMPLE DESCRIPTION DERM
LOCATION SIMPLE: LEFT FOREHEAD
LOCATION SIMPLE: RIGHT FOREHEAD
LOCATION SIMPLE: LEFT CHEEK

## 2022-03-29 NOTE — PROCEDURE: TREATMENT REGIMEN
Plan: History of AKs; treated in past with PDT and LN2\\n- increasing in number today,recommend re-treating with field therapy \\n- he tolerated PDT well last time, will schedule for repeat PDT to face and ears, will try to get treatment session in before fishing season starts \\nContinue UV protection
Detail Level: Zone

## 2022-07-21 ENCOUNTER — APPOINTMENT (RX ONLY)
Dept: URBAN - METROPOLITAN AREA CLINIC 43 | Facility: CLINIC | Age: 77
Setting detail: DERMATOLOGY
End: 2022-07-21

## 2022-07-21 DIAGNOSIS — L57.0 ACTINIC KERATOSIS: ICD-10-CM

## 2022-07-21 PROCEDURE — ? PDT: BLUE

## 2022-07-21 PROCEDURE — 96573 PDT DSTR PRMLG LES PHYS/QHP: CPT

## 2022-07-21 ASSESSMENT — LOCATION SIMPLE DESCRIPTION DERM
LOCATION SIMPLE: RIGHT EAR
LOCATION SIMPLE: LEFT CHEEK
LOCATION SIMPLE: LEFT EAR

## 2022-07-21 ASSESSMENT — LOCATION DETAILED DESCRIPTION DERM
LOCATION DETAILED: RIGHT SUPERIOR HELIX
LOCATION DETAILED: LEFT CENTRAL MALAR CHEEK
LOCATION DETAILED: LEFT SCAPHA

## 2022-07-21 ASSESSMENT — LOCATION ZONE DERM
LOCATION ZONE: EAR
LOCATION ZONE: FACE

## 2022-07-21 NOTE — PROCEDURE: PDT: BLUE
Show Medical Necessity In Plan?: Yes
Medical Necessity: Precancerous Lesions
Debridement Text (Will Only Render In Visit Note If You Select Debridement Option Under Who Performed The Pdt Field): Prior to application of the photodynamic medication the hyperkeratotic lesions were debrided until they were no longer hyperkeratotic lesions to make them more amenable to this therapy.
Occlusion: No
Illumination Time: 1 hour
Anesthesia Volume In Cc: 0
Pre-Procedure Text: The treatment areas were cleaned and prepped in the usual fashion.
Who Performed The Pdt (Provider): OLEG
Consent: Written consent obtained.  The risks were reviewed with the patient including but not limited to: pigmentary changes, pain, blistering, scabbing, redness, and the remote possibility of scarring.
Detail Level: Zone
Light Source: Chato-U
Incubation Time: 15 minutes
Total Number Of Aks Treated (Optional To Report): 20
Post-Care Instructions: I reviewed with the patient in detail post-care instructions. Patient is to avoid sunlight for the next 2 days, and wear sun protection. Patients may expect sunburn like redness, discomfort and scabbing. Plastibase prn throughput peeling process.
Anesthesia Type: normal saline
Number Of Kerasticks/Tubes Billed For: 1
Comments: Patient presents with numerous raised, red, and scaly lesion throughout his face. He has history of blistering sunburn and AK’s. Lesions are too numerous to be treated with LN2.
Which Photosensitizer Was Used: Levulan
Who Performed The Pdt?: Performed by MD DIAN, ZARINA or NP ([id number])
Who Performed The Pdt (Staff): self

## 2023-08-08 ENCOUNTER — RX ONLY (OUTPATIENT)
Age: 78
Setting detail: RX ONLY
End: 2023-08-08

## 2023-08-08 RX ORDER — CLOBETASOL PROPIONATE 0.5 MG/G
OINTMENT TOPICAL
Qty: 45 | Refills: 0 | Status: ERX

## 2023-08-23 ENCOUNTER — APPOINTMENT (RX ONLY)
Dept: URBAN - METROPOLITAN AREA CLINIC 43 | Facility: CLINIC | Age: 78
Setting detail: DERMATOLOGY
End: 2023-08-23

## 2023-08-23 DIAGNOSIS — L20.89 OTHER ATOPIC DERMATITIS: ICD-10-CM

## 2023-08-23 PROBLEM — L30.9 DERMATITIS, UNSPECIFIED: Status: ACTIVE | Noted: 2023-08-23

## 2023-08-23 PROCEDURE — ? BIOPSY BY PUNCH METHOD

## 2023-08-23 PROCEDURE — ? PRESCRIPTION

## 2023-08-23 PROCEDURE — 11105 PUNCH BX SKIN EA SEP/ADDL: CPT

## 2023-08-23 PROCEDURE — ? TREATMENT REGIMEN

## 2023-08-23 PROCEDURE — 11104 PUNCH BX SKIN SINGLE LESION: CPT

## 2023-08-23 RX ORDER — PREDNISONE 20 MG/1
TABLET ORAL
Qty: 14 | Refills: 0 | Status: ERX | COMMUNITY
Start: 2023-08-23

## 2023-08-23 RX ADMIN — PREDNISONE: 20 TABLET ORAL at 00:00

## 2023-08-23 ASSESSMENT — LOCATION SIMPLE DESCRIPTION DERM
LOCATION SIMPLE: LEFT UPPER ARM
LOCATION SIMPLE: LEFT SHOULDER
LOCATION SIMPLE: RIGHT UPPER ARM
LOCATION SIMPLE: POSTERIOR NECK
LOCATION SIMPLE: LEFT UPPER BACK
LOCATION SIMPLE: RIGHT UPPER BACK

## 2023-08-23 ASSESSMENT — LOCATION DETAILED DESCRIPTION DERM
LOCATION DETAILED: RIGHT SUPERIOR UPPER BACK
LOCATION DETAILED: LEFT MEDIAL TRAPEZIAL NECK
LOCATION DETAILED: RIGHT ANTERIOR DISTAL UPPER ARM
LOCATION DETAILED: LEFT ANTERIOR PROXIMAL UPPER ARM
LOCATION DETAILED: LEFT SUPERIOR LATERAL UPPER BACK
LOCATION DETAILED: LEFT LATERAL TRAPEZIAL NECK
LOCATION DETAILED: LEFT POSTERIOR SHOULDER

## 2023-08-23 ASSESSMENT — LOCATION ZONE DERM
LOCATION ZONE: TRUNK
LOCATION ZONE: ARM
LOCATION ZONE: NECK

## 2023-08-23 NOTE — PROCEDURE: BIOPSY BY PUNCH METHOD
Detail Level: Detailed
Was A Bandage Applied: Yes
Accession #: [id number]
Punch Size In Mm: 4
Size Of Lesion In Cm (Optional): 0
Depth Of Punch Biopsy: dermis
Biopsy Type: H and E
Anesthesia Type: 2% lidocaine with epinephrine and a 1:10 solution of 8.4% sodium bicarbonate
Anesthesia Volume In Cc: 1
Hemostasis: Pressure
Epidermal Sutures: 4-0 Prolene
Wound Care: Petrolatum
Dressing: Band-Aid
Suture Removal: 7 days
Patient Will Remove Sutures At Home?: No
Lab: [id number]
Consent: Oral consent was obtained and risks were reviewed including but not limited to scarring, infection, bleeding, scabbing, incomplete removal, nerve damage and allergy to anesthesia.
Post-Care Instructions: I reviewed with the patient in detail post-care instructions. Patient is to keep the biopsy site covered and  dry overnight, and then wash gently with soap and water and apply vaseline twice daily until healed.
Home Suture Removal Text: Patient was provided a home suture removal kit and will remove their sutures at home.  If they have any questions or difficulties they will call the office.
Notification Instructions: Patient will be notified of biopsy results. However, patient instructed to call the office if not contacted within 2 weeks.
Billing Type: Third-Party Bill
Information: Selecting Yes will display possible errors in your note based on the variables you have selected. This validation is only offered as a suggestion for you. PLEASE NOTE THAT THE VALIDATION TEXT WILL BE REMOVED WHEN YOU FINALIZE YOUR NOTE. IF YOU WANT TO FAX A PRELIMINARY NOTE YOU WILL NEED TO TOGGLE THIS TO 'NO' IF YOU DO NOT WANT IT IN YOUR FAXED NOTE.
Accession #: [id number]
Biopsy Type: DIF

## 2023-08-23 NOTE — HPI: RASH
What Type Of Note Output Would You Prefer (Optional)?: Standard Output
Is The Patient Presenting As Previously Scheduled?: Yes
How Severe Is Your Rash?: moderate
Is This A New Presentation, Or A Follow-Up?: Rash
Additional History: Pt reports his rash first appeared as red, itchy, weeping, patches about three weeks ago. Pt went to [facility] one week after rash appeared for evaluation and tx. [place] injected him with Solu-Medrol, cimetidine, and a round of oral prednisone. Pt reports his rash is still present but is no longer weeping or itchy.

## 2023-09-07 ENCOUNTER — RX ONLY (OUTPATIENT)
Age: 78
Setting detail: RX ONLY
End: 2023-09-07

## 2023-09-07 RX ORDER — CLOBETASOL PROPIONATE 0.5 MG/G
CREAM TOPICAL
Qty: 120 | Refills: 1 | Status: ERX | COMMUNITY
Start: 2023-09-07

## 2023-11-20 ENCOUNTER — APPOINTMENT (RX ONLY)
Dept: URBAN - METROPOLITAN AREA CLINIC 43 | Facility: CLINIC | Age: 78
Setting detail: DERMATOLOGY
End: 2023-11-20

## 2023-11-20 DIAGNOSIS — L85.3 XEROSIS CUTIS: ICD-10-CM | Status: WORSENING

## 2023-11-20 PROCEDURE — 99214 OFFICE O/P EST MOD 30 MIN: CPT

## 2023-11-20 PROCEDURE — ? TREATMENT REGIMEN

## 2023-11-20 PROCEDURE — ? PRESCRIPTION

## 2023-11-20 PROCEDURE — ? COUNSELING

## 2023-11-20 RX ORDER — CLOBETASOL PROPIONATE 0.5 MG/G
OINTMENT TOPICAL
Qty: 60 | Refills: 4 | Status: ERX | COMMUNITY
Start: 2023-11-20

## 2023-11-20 RX ADMIN — CLOBETASOL PROPIONATE: 0.5 OINTMENT TOPICAL at 00:00

## 2023-11-20 ASSESSMENT — LOCATION DETAILED DESCRIPTION DERM
LOCATION DETAILED: RIGHT ANTERIOR LATERAL DISTAL THIGH
LOCATION DETAILED: RIGHT DISTAL PRETIBIAL REGION
LOCATION DETAILED: RIGHT DORSAL FOOT
LOCATION DETAILED: LEFT ANTERIOR DISTAL UPPER ARM
LOCATION DETAILED: LEFT DISTAL PRETIBIAL REGION
LOCATION DETAILED: RIGHT ANTERIOR PROXIMAL THIGH
LOCATION DETAILED: LEFT DORSAL FOOT
LOCATION DETAILED: RIGHT ANKLE
LOCATION DETAILED: LEFT ANTERIOR PROXIMAL THIGH
LOCATION DETAILED: LEFT PROXIMAL PRETIBIAL REGION

## 2023-11-20 ASSESSMENT — SEVERITY ASSESSMENT 2020: SEVERITY 2020: MODERATE

## 2023-11-20 ASSESSMENT — LOCATION SIMPLE DESCRIPTION DERM
LOCATION SIMPLE: LEFT UPPER ARM
LOCATION SIMPLE: LEFT THIGH
LOCATION SIMPLE: LEFT FOOT
LOCATION SIMPLE: RIGHT FOOT
LOCATION SIMPLE: RIGHT PRETIBIAL REGION
LOCATION SIMPLE: LEFT PRETIBIAL REGION
LOCATION SIMPLE: RIGHT THIGH
LOCATION SIMPLE: RIGHT ANKLE

## 2023-11-20 ASSESSMENT — LOCATION ZONE DERM
LOCATION ZONE: ARM
LOCATION ZONE: FEET
LOCATION ZONE: LEG

## 2023-11-20 ASSESSMENT — BSA RASH: BSA RASH: 10

## 2023-11-20 ASSESSMENT — PAIN INTENSITY VAS: HOW INTENSE IS YOUR PAIN 0 BEING NO PAIN, 10 BEING THE MOST SEVERE PAIN POSSIBLE?: NO PAIN

## 2023-11-20 NOTE — PROCEDURE: TREATMENT REGIMEN
Detail Level: Simple
Initiate Treatment: Restart clobetasol to lower legs
Continue Regimen: Use compression stockings if noticing lower leg swelling, sensations of “heaviness” or deep sock indentations \\nElevate legs above level of heart while resting
Plan: Increasing eczema craquelae of lower legs \\nUse a twice daily moisturizer when not applying steroid ointment \\nIf inadequate response then consider: \\n- histidine supplementation 4grams/day (over the counter amino acid) \\n- home NB-UVB unit \\n- transition to systemic therapy with Dupixent (IL-13 blocker) \\n\\nRecheck again in 3 mo

## 2024-01-01 NOTE — HPI: SKIN LESION (ACTINIC KERATOSES)
COMMENTS:  TcB- 8.8 mg/dL. Phototherapy threshold 9.7-11.7 mg/dL. Serum obtained 7.4 mg/dL    PLANS:   - Discontinue phototherapy  - Follow Tcb in am  
How Severe Are They?: moderate
Is This A New Presentation, Or A Follow-Up?: Skin Lesions

## 2024-03-14 ENCOUNTER — APPOINTMENT (RX ONLY)
Dept: URBAN - METROPOLITAN AREA CLINIC 43 | Facility: CLINIC | Age: 79
Setting detail: DERMATOLOGY
End: 2024-03-14

## 2024-03-14 DIAGNOSIS — L85.3 XEROSIS CUTIS: ICD-10-CM | Status: IMPROVED

## 2024-03-14 DIAGNOSIS — L57.0 ACTINIC KERATOSIS: ICD-10-CM | Status: INADEQUATELY CONTROLLED

## 2024-03-14 PROCEDURE — 99213 OFFICE O/P EST LOW 20 MIN: CPT

## 2024-03-14 PROCEDURE — ? TREATMENT REGIMEN

## 2024-03-14 ASSESSMENT — LOCATION DETAILED DESCRIPTION DERM
LOCATION DETAILED: RIGHT INFERIOR FOREHEAD
LOCATION DETAILED: LEFT DISTAL PRETIBIAL REGION
LOCATION DETAILED: LEFT CENTRAL MALAR CHEEK
LOCATION DETAILED: LEFT DORSAL FOOT
LOCATION DETAILED: RIGHT INFERIOR LATERAL MALAR CHEEK
LOCATION DETAILED: RIGHT ANKLE
LOCATION DETAILED: NASAL DORSUM
LOCATION DETAILED: RIGHT CENTRAL MALAR CHEEK
LOCATION DETAILED: NASAL ROOT
LOCATION DETAILED: RIGHT LATERAL FOREHEAD
LOCATION DETAILED: RIGHT DISTAL PRETIBIAL REGION
LOCATION DETAILED: LEFT SUPERIOR CENTRAL MALAR CHEEK
LOCATION DETAILED: LEFT FOREHEAD

## 2024-03-14 ASSESSMENT — LOCATION SIMPLE DESCRIPTION DERM
LOCATION SIMPLE: LEFT FOREHEAD
LOCATION SIMPLE: LEFT FOOT
LOCATION SIMPLE: RIGHT ANKLE
LOCATION SIMPLE: RIGHT PRETIBIAL REGION
LOCATION SIMPLE: NOSE
LOCATION SIMPLE: RIGHT CHEEK
LOCATION SIMPLE: LEFT PRETIBIAL REGION
LOCATION SIMPLE: RIGHT FOREHEAD
LOCATION SIMPLE: LEFT CHEEK

## 2024-03-14 ASSESSMENT — LOCATION ZONE DERM
LOCATION ZONE: FACE
LOCATION ZONE: NOSE
LOCATION ZONE: LEG
LOCATION ZONE: FEET

## 2024-03-14 ASSESSMENT — PAIN INTENSITY VAS: HOW INTENSE IS YOUR PAIN 0 BEING NO PAIN, 10 BEING THE MOST SEVERE PAIN POSSIBLE?: NO PAIN

## 2024-03-14 ASSESSMENT — SEVERITY ASSESSMENT 2020: SEVERITY 2020: MODERATE

## 2024-03-14 NOTE — PROCEDURE: TREATMENT REGIMEN
Detail Level: Simple
Continue Regimen: Much improved today!\\nContinue histidine supplementation (taking 4.5g/day) \\nContinue clobetasol 0.05% ointment twice daily as needed for flaring areas \\nUse compression stockings if noticing lower leg swelling, sensations of “heaviness” or deep sock indentations \\nElevate legs above level of heart while resting\\nRecheck in 6 mo
Plan: Increasing eczema craquelae of lower legs \\nUse a twice daily moisturizer when not applying steroid ointment \\nIf inadequate response then consider: \\n- histidine supplementation 4grams/day (over the counter amino acid) \\n- home NB-UVB unit \\n- transition to systemic therapy with Dupixent (IL-13 blocker) \\n\\nRecheck again in 3 mo
Plan: History of AKs; treated in past with PDT and LN2\\n- last PDT to face was in summer 2022\\n- has increasing numbers of scattered AK on face; recommend scheduling for repeat PDT (will be treatment #3 to face) \\n- handout given, vitamin D supplementation recommendation reviewed; he will call Abigail to schedule\\n- follow up after PDT; he is busy all summer with fishing\\n- scheduled for September 2024 \\n- continue daily UV protection with brimmed hat, SPF daily
Detail Level: Zone

## 2024-03-27 ENCOUNTER — APPOINTMENT (RX ONLY)
Dept: URBAN - METROPOLITAN AREA CLINIC 43 | Facility: CLINIC | Age: 79
Setting detail: DERMATOLOGY
End: 2024-03-27

## 2024-03-27 DIAGNOSIS — L57.0 ACTINIC KERATOSIS: ICD-10-CM

## 2024-03-27 PROCEDURE — ? PDT: BLUE

## 2024-03-27 PROCEDURE — 96573 PDT DSTR PRMLG LES PHYS/QHP: CPT

## 2024-03-27 ASSESSMENT — LOCATION ZONE DERM
LOCATION ZONE: FACE
LOCATION ZONE: EAR

## 2024-03-27 ASSESSMENT — LOCATION SIMPLE DESCRIPTION DERM
LOCATION SIMPLE: RIGHT EAR
LOCATION SIMPLE: GLABELLA
LOCATION SIMPLE: LEFT EAR

## 2024-03-27 ASSESSMENT — LOCATION DETAILED DESCRIPTION DERM
LOCATION DETAILED: RIGHT CRUS OF HELIX
LOCATION DETAILED: GLABELLA
LOCATION DETAILED: LEFT INFERIOR CRUS OF ANTIHELIX

## 2024-03-27 NOTE — PROCEDURE: PDT: BLUE
Who Performed The Pdt?: Performed by MD DIAN, ZARINA or NP ([id number])
History Of Hsv?: No
Total Number Of Aks Treated (Optional To Report): 16
Incubation Time: 00:15:00
Post-Care Instructions: I reviewed with the patient in detail post-care instructions. Patient is to avoid sunlight for the next 2 days, and wear sun protection. Patients may expect sunburn like redness, discomfort and scabbing.
Show Anesthesia In Plan?: Yes
Comments: Patient presents with diffuse red, scaly lesions on face and ears . Patient has a history of AK . Lesions are too numerous to treat with LN2. Patient  pretreated with 10,000u Vit D for two weeks prior to procedure.
Number Of Kerasticks/Tubes Billed For: 1
Detail Level: Zone
Debridement Text (Will Only Render In Visit Note If You Select Debridement Option Under Who Performed The Pdt Field): Prior to application of the photodynamic medication the hyperkeratotic lesions were curetted to make them more amenable to therapy.
Treatment Number: 0
Which Photosensitizer Was Used: Levulan
Anesthesia Type: 1% lidocaine with epinephrine
Light Source: Chato-U
Consent: Written consent obtained.  The risks were reviewed with the patient including but not limited to: pigmentary changes, pain, blistering, scabbing, redness, and the remote possibility of scarring.
Pre-Procedure Text: The treatment areas were cleaned and prepped in the usual fashion.
Medical Necessity: Precancerous Lesions
Illumination Time: 01:00:00

## 2024-09-19 ENCOUNTER — APPOINTMENT (RX ONLY)
Dept: URBAN - METROPOLITAN AREA CLINIC 43 | Facility: CLINIC | Age: 79
Setting detail: DERMATOLOGY
End: 2024-09-19

## 2024-09-19 DIAGNOSIS — L85.3 XEROSIS CUTIS: ICD-10-CM | Status: INADEQUATELY CONTROLLED

## 2024-09-19 DIAGNOSIS — L57.0 ACTINIC KERATOSIS: ICD-10-CM

## 2024-09-19 PROCEDURE — ? LIQUID NITROGEN

## 2024-09-19 PROCEDURE — ? PRESCRIPTION

## 2024-09-19 PROCEDURE — ? TREATMENT REGIMEN

## 2024-09-19 PROCEDURE — 17000 DESTRUCT PREMALG LESION: CPT

## 2024-09-19 PROCEDURE — 99214 OFFICE O/P EST MOD 30 MIN: CPT | Mod: 25

## 2024-09-19 RX ORDER — PIMECROLIMUS 10 MG/G
CREAM TOPICAL
Qty: 30 | Refills: 3 | Status: ERX | COMMUNITY
Start: 2024-09-19

## 2024-09-19 RX ADMIN — PIMECROLIMUS: 10 CREAM TOPICAL at 00:00

## 2024-09-19 ASSESSMENT — LOCATION DETAILED DESCRIPTION DERM
LOCATION DETAILED: LEFT DISTAL PRETIBIAL REGION
LOCATION DETAILED: NASAL ROOT
LOCATION DETAILED: RIGHT ANKLE
LOCATION DETAILED: LEFT FOREHEAD
LOCATION DETAILED: RIGHT INFERIOR FOREHEAD
LOCATION DETAILED: RIGHT DISTAL PRETIBIAL REGION
LOCATION DETAILED: NASAL DORSUM
LOCATION DETAILED: RIGHT INFERIOR LATERAL MALAR CHEEK
LOCATION DETAILED: LEFT SUPERIOR HELIX
LOCATION DETAILED: LEFT CENTRAL MALAR CHEEK
LOCATION DETAILED: RIGHT CENTRAL MALAR CHEEK
LOCATION DETAILED: LEFT SUPERIOR CENTRAL MALAR CHEEK
LOCATION DETAILED: LEFT DORSAL FOOT
LOCATION DETAILED: RIGHT LATERAL FOREHEAD

## 2024-09-19 ASSESSMENT — LOCATION SIMPLE DESCRIPTION DERM
LOCATION SIMPLE: LEFT CHEEK
LOCATION SIMPLE: NOSE
LOCATION SIMPLE: LEFT PRETIBIAL REGION
LOCATION SIMPLE: LEFT FOREHEAD
LOCATION SIMPLE: RIGHT PRETIBIAL REGION
LOCATION SIMPLE: RIGHT ANKLE
LOCATION SIMPLE: RIGHT CHEEK
LOCATION SIMPLE: LEFT FOOT
LOCATION SIMPLE: LEFT EAR
LOCATION SIMPLE: RIGHT FOREHEAD

## 2024-09-19 ASSESSMENT — LOCATION ZONE DERM
LOCATION ZONE: EAR
LOCATION ZONE: NOSE
LOCATION ZONE: LEG
LOCATION ZONE: FACE
LOCATION ZONE: FEET

## 2024-09-19 NOTE — PROCEDURE: TREATMENT REGIMEN
Detail Level: Simple
Continue Regimen: Stable but still with significant areas of involvement over bilateral lower legs and dorsal feet/ankles \\nContinue histidine supplementation (taking 4.5g/day) \\nContinue clobetasol 0.05% ointment twice daily as needed for flaring areas \\nADD pimecrolimus 0.1% cream twice daily to lower legs - can call for larger size of tube if helpful \\nUse compression stockings if noticing lower leg swelling, sensations of “heaviness” or deep sock indentations \\nElevate legs above level of heart while resting\\nRecheck in 6 mo
Plan: Increasing eczema craquelae of lower legs \\nUse a twice daily moisturizer when not applying steroid ointment \\nIf inadequate response then consider: \\n- histidine supplementation 4grams/day (over the counter amino acid) \\n- home NB-UVB unit \\n- transition to systemic therapy with Dupixent (IL-13 blocker) \\n\\nRecheck again in 3 mo
Plan: History of AKs; treated in past with PDT and LN2\\n- clear on face today after PDT in spring 2024 \\n- has solitary AK on left ear, treated with LN2 today\\n- did great with PDT this year, much less pain than previously
Detail Level: Zone

## 2024-09-19 NOTE — PROCEDURE: LIQUID NITROGEN
Show Applicator Variable?: Yes
Number Of Freeze-Thaw Cycles: 2 freeze-thaw cycles
Render Post-Care Instructions In Note?: no
Post-Care Instructions: I reviewed with the patient in detail post-care instructions. Patient is to wear sunprotection, and avoid picking at any of the treated lesions. Pt may apply Vaseline to crusted or scabbing areas.
Duration Of Freeze Thaw-Cycle (Seconds): 0
Detail Level: Detailed
Consent: The patient's consent was obtained including but not limited to risks of crusting, scabbing, blistering, scarring, darker or lighter pigmentary change, recurrence, incomplete removal and infection.

## 2025-01-27 NOTE — PROCEDURE: TREATMENT REGIMEN
yes
Detail Level: Detailed
Plan: 3 week history of acute, severe, eczematous eruption that started on upper arms, cleared with prednisone and then recurred on upper back \\n- patient has known poison oak allergy and was in the brush 1 week prior to eruption; however, was in heavy clothing that covered all affected areas \\n- no improvement with clobetasol ointment twice daily during initial eruption \\n- will restart prednisone 20mg daily for planned 14 day course \\n- biopsy for H/E and DIF obtained and pending; will call with results\\n- see back again in 2 weeks

## 2025-04-16 ENCOUNTER — APPOINTMENT (OUTPATIENT)
Dept: URBAN - METROPOLITAN AREA CLINIC 43 | Facility: CLINIC | Age: 80
Setting detail: DERMATOLOGY
End: 2025-04-16

## 2025-04-16 DIAGNOSIS — L82.0 INFLAMED SEBORRHEIC KERATOSIS: ICD-10-CM

## 2025-04-16 DIAGNOSIS — L85.3 XEROSIS CUTIS: ICD-10-CM | Status: WELL CONTROLLED

## 2025-04-16 PROCEDURE — ? COUNSELING

## 2025-04-16 PROCEDURE — 99213 OFFICE O/P EST LOW 20 MIN: CPT

## 2025-04-16 PROCEDURE — ? TREATMENT REGIMEN

## 2025-04-16 ASSESSMENT — LOCATION SIMPLE DESCRIPTION DERM
LOCATION SIMPLE: LEFT FOOT
LOCATION SIMPLE: RIGHT ANKLE
LOCATION SIMPLE: LEFT FOREHEAD
LOCATION SIMPLE: LEFT PRETIBIAL REGION
LOCATION SIMPLE: RIGHT PRETIBIAL REGION

## 2025-04-16 ASSESSMENT — SEVERITY ASSESSMENT 2020: SEVERITY 2020: MILD

## 2025-04-16 ASSESSMENT — LOCATION ZONE DERM
LOCATION ZONE: FACE
LOCATION ZONE: FEET
LOCATION ZONE: LEG

## 2025-04-16 ASSESSMENT — LOCATION DETAILED DESCRIPTION DERM
LOCATION DETAILED: RIGHT ANKLE
LOCATION DETAILED: LEFT DISTAL PRETIBIAL REGION
LOCATION DETAILED: LEFT DORSAL FOOT
LOCATION DETAILED: RIGHT DISTAL PRETIBIAL REGION
LOCATION DETAILED: LEFT INFERIOR LATERAL FOREHEAD

## 2025-04-16 ASSESSMENT — PAIN INTENSITY VAS: HOW INTENSE IS YOUR PAIN 0 BEING NO PAIN, 10 BEING THE MOST SEVERE PAIN POSSIBLE?: NO PAIN

## 2025-04-16 NOTE — PROCEDURE: TREATMENT REGIMEN
Detail Level: Simple
Continue Regimen: Stable but still with significant areas of involvement over bilateral lower legs and dorsal feet/ankles \\nContinue histidine supplementation (taking 4.5g/day) \\nContinue clobetasol 0.05% ointment twice daily as needed for flaring areas \\nUse compression stockings if noticing lower leg swelling, sensations of “heaviness” or deep sock indentations \\nElevate legs above level of heart while resting
Plan: Increasing eczema craquelae of lower legs \\nUse a twice daily moisturizer when not applying steroid ointment \\nIf inadequate response then consider: \\n- histidine supplementation 4grams/day (over the counter amino acid) \\n- home NB-UVB unit \\n- transition to systemic therapy with Dupixent (IL-13 blocker) \\n\\nRecheck again in 3 mo